# Patient Record
Sex: FEMALE | Race: WHITE | NOT HISPANIC OR LATINO | Employment: OTHER | URBAN - METROPOLITAN AREA
[De-identification: names, ages, dates, MRNs, and addresses within clinical notes are randomized per-mention and may not be internally consistent; named-entity substitution may affect disease eponyms.]

---

## 2017-11-08 ENCOUNTER — RECORDS - HEALTHEAST (OUTPATIENT)
Dept: LAB | Facility: CLINIC | Age: 77
End: 2017-11-08

## 2017-11-08 LAB
CHOLEST SERPL-MCNC: 193 MG/DL
FASTING STATUS PATIENT QL REPORTED: NORMAL
HDLC SERPL-MCNC: 63 MG/DL
LDLC SERPL CALC-MCNC: 103 MG/DL
TRIGL SERPL-MCNC: 137 MG/DL

## 2018-08-08 ENCOUNTER — RECORDS - HEALTHEAST (OUTPATIENT)
Dept: ADMINISTRATIVE | Facility: OTHER | Age: 78
End: 2018-08-08

## 2018-08-08 ENCOUNTER — AMBULATORY - HEALTHEAST (OUTPATIENT)
Dept: CARDIOLOGY | Facility: CLINIC | Age: 78
End: 2018-08-08

## 2018-08-09 ENCOUNTER — AMBULATORY - HEALTHEAST (OUTPATIENT)
Dept: CARDIOLOGY | Facility: CLINIC | Age: 78
End: 2018-08-09

## 2018-08-09 ENCOUNTER — OFFICE VISIT - HEALTHEAST (OUTPATIENT)
Dept: CARDIOLOGY | Facility: CLINIC | Age: 78
End: 2018-08-09

## 2018-08-09 DIAGNOSIS — Z95.0 S/P PLACEMENT OF CARDIAC PACEMAKER: ICD-10-CM

## 2018-08-09 DIAGNOSIS — I48.0 PAROXYSMAL ATRIAL FIBRILLATION (H): ICD-10-CM

## 2018-08-09 DIAGNOSIS — I44.1 MOBITZ TYPE 2 SECOND DEGREE ATRIOVENTRICULAR BLOCK: ICD-10-CM

## 2018-08-09 RX ORDER — ESTRADIOL 0.5 MG/1
1 TABLET ORAL DAILY
Status: SHIPPED | COMMUNITY
Start: 2018-08-09

## 2018-08-09 RX ORDER — ATORVASTATIN CALCIUM 10 MG/1
10 TABLET, FILM COATED ORAL DAILY
Status: SHIPPED | COMMUNITY
Start: 2018-04-05 | End: 2023-10-25

## 2018-08-09 RX ORDER — ACETAMINOPHEN 325 MG/1
650 TABLET ORAL PRN
Status: SHIPPED | COMMUNITY
Start: 2018-04-05

## 2018-08-09 ASSESSMENT — MIFFLIN-ST. JEOR: SCORE: 1201.18

## 2018-12-04 ENCOUNTER — RECORDS - HEALTHEAST (OUTPATIENT)
Dept: ADMINISTRATIVE | Facility: OTHER | Age: 78
End: 2018-12-04

## 2018-12-06 ENCOUNTER — AMBULATORY - HEALTHEAST (OUTPATIENT)
Dept: CARDIOLOGY | Facility: CLINIC | Age: 78
End: 2018-12-06

## 2018-12-06 ENCOUNTER — OFFICE VISIT - HEALTHEAST (OUTPATIENT)
Dept: CARDIOLOGY | Facility: CLINIC | Age: 78
End: 2018-12-06

## 2018-12-06 DIAGNOSIS — I44.1 MOBITZ TYPE 2 SECOND DEGREE ATRIOVENTRICULAR BLOCK: ICD-10-CM

## 2018-12-06 DIAGNOSIS — Z95.0 S/P PLACEMENT OF CARDIAC PACEMAKER: ICD-10-CM

## 2018-12-06 DIAGNOSIS — I48.0 PAROXYSMAL ATRIAL FIBRILLATION (H): ICD-10-CM

## 2018-12-06 LAB
HCC DEVICE COMMENTS: NORMAL
HCC DEVICE IMPLANTING PROVIDER: NORMAL
HCC DEVICE MANUFACTURE: NORMAL
HCC DEVICE MODEL: NORMAL
HCC DEVICE SERIAL NUMBER: NORMAL
HCC DEVICE TYPE: NORMAL

## 2018-12-06 ASSESSMENT — MIFFLIN-ST. JEOR: SCORE: 1222.04

## 2018-12-19 ENCOUNTER — AMBULATORY - HEALTHEAST (OUTPATIENT)
Dept: CARDIOLOGY | Facility: CLINIC | Age: 78
End: 2018-12-19

## 2019-03-06 ENCOUNTER — AMBULATORY - HEALTHEAST (OUTPATIENT)
Dept: CARDIOLOGY | Facility: CLINIC | Age: 79
End: 2019-03-06

## 2019-03-06 DIAGNOSIS — Z95.0 CARDIAC PACEMAKER IN SITU: ICD-10-CM

## 2019-03-08 ENCOUNTER — COMMUNICATION - HEALTHEAST (OUTPATIENT)
Dept: CARDIOLOGY | Facility: CLINIC | Age: 79
End: 2019-03-08

## 2019-04-09 ENCOUNTER — COMMUNICATION - HEALTHEAST (OUTPATIENT)
Dept: ADMINISTRATIVE | Facility: CLINIC | Age: 79
End: 2019-04-09

## 2019-05-30 ENCOUNTER — OFFICE VISIT - HEALTHEAST (OUTPATIENT)
Dept: CARDIOLOGY | Facility: CLINIC | Age: 79
End: 2019-05-30

## 2019-05-30 DIAGNOSIS — E78.2 MIXED HYPERLIPIDEMIA: ICD-10-CM

## 2019-05-30 DIAGNOSIS — I44.1 MOBITZ TYPE 2 SECOND DEGREE ATRIOVENTRICULAR BLOCK: ICD-10-CM

## 2019-05-30 DIAGNOSIS — I48.0 PAROXYSMAL ATRIAL FIBRILLATION (H): ICD-10-CM

## 2019-05-30 DIAGNOSIS — Z95.0 S/P PLACEMENT OF CARDIAC PACEMAKER: ICD-10-CM

## 2019-05-30 ASSESSMENT — MIFFLIN-ST. JEOR: SCORE: 1203.9

## 2019-06-04 ENCOUNTER — AMBULATORY - HEALTHEAST (OUTPATIENT)
Dept: CARDIOLOGY | Facility: CLINIC | Age: 79
End: 2019-06-04

## 2019-06-04 DIAGNOSIS — Z95.0 CARDIAC PACEMAKER IN SITU: ICD-10-CM

## 2019-06-10 ENCOUNTER — RECORDS - HEALTHEAST (OUTPATIENT)
Dept: LAB | Facility: CLINIC | Age: 79
End: 2019-06-10

## 2019-06-10 LAB
ALBUMIN SERPL-MCNC: 4.2 G/DL (ref 3.5–5)
ALP SERPL-CCNC: 69 U/L (ref 45–120)
ALT SERPL W P-5'-P-CCNC: 23 U/L (ref 0–45)
ANION GAP SERPL CALCULATED.3IONS-SCNC: 6 MMOL/L (ref 5–18)
AST SERPL W P-5'-P-CCNC: 31 U/L (ref 0–40)
BILIRUB SERPL-MCNC: 0.5 MG/DL (ref 0–1)
BUN SERPL-MCNC: 13 MG/DL (ref 8–28)
CALCIUM SERPL-MCNC: 10.4 MG/DL (ref 8.5–10.5)
CHLORIDE BLD-SCNC: 104 MMOL/L (ref 98–107)
CHOLEST SERPL-MCNC: 157 MG/DL
CO2 SERPL-SCNC: 31 MMOL/L (ref 22–31)
CREAT SERPL-MCNC: 0.78 MG/DL (ref 0.6–1.1)
FASTING STATUS PATIENT QL REPORTED: NORMAL
GFR SERPL CREATININE-BSD FRML MDRD: >60 ML/MIN/1.73M2
GLUCOSE BLD-MCNC: 82 MG/DL (ref 70–125)
HDLC SERPL-MCNC: 67 MG/DL
LDLC SERPL CALC-MCNC: 63 MG/DL
POTASSIUM BLD-SCNC: 3.9 MMOL/L (ref 3.5–5)
PROT SERPL-MCNC: 7.5 G/DL (ref 6–8)
SODIUM SERPL-SCNC: 141 MMOL/L (ref 136–145)
TRIGL SERPL-MCNC: 134 MG/DL

## 2019-07-26 ENCOUNTER — COMMUNICATION - HEALTHEAST (OUTPATIENT)
Dept: CARDIOLOGY | Facility: CLINIC | Age: 79
End: 2019-07-26

## 2019-07-26 DIAGNOSIS — I48.0 PAROXYSMAL ATRIAL FIBRILLATION (H): ICD-10-CM

## 2019-07-26 RX ORDER — APIXABAN 5 MG/1
TABLET, FILM COATED ORAL
Qty: 180 TABLET | Refills: 2 | Status: SHIPPED | OUTPATIENT
Start: 2019-07-26

## 2019-09-05 ENCOUNTER — AMBULATORY - HEALTHEAST (OUTPATIENT)
Dept: CARDIOLOGY | Facility: CLINIC | Age: 79
End: 2019-09-05

## 2019-09-05 DIAGNOSIS — Z95.0 CARDIAC PACEMAKER IN SITU: ICD-10-CM

## 2019-11-27 ENCOUNTER — AMBULATORY - HEALTHEAST (OUTPATIENT)
Dept: CARDIOLOGY | Facility: CLINIC | Age: 79
End: 2019-11-27

## 2019-11-27 ENCOUNTER — RECORDS - HEALTHEAST (OUTPATIENT)
Dept: ADMINISTRATIVE | Facility: OTHER | Age: 79
End: 2019-11-27

## 2019-12-02 ENCOUNTER — AMBULATORY - HEALTHEAST (OUTPATIENT)
Dept: CARDIOLOGY | Facility: CLINIC | Age: 79
End: 2019-12-02

## 2019-12-02 ENCOUNTER — OFFICE VISIT - HEALTHEAST (OUTPATIENT)
Dept: CARDIOLOGY | Facility: CLINIC | Age: 79
End: 2019-12-02

## 2019-12-02 DIAGNOSIS — Z95.0 CARDIAC PACEMAKER IN SITU: ICD-10-CM

## 2019-12-02 DIAGNOSIS — E78.2 MIXED HYPERLIPIDEMIA: ICD-10-CM

## 2019-12-02 DIAGNOSIS — I48.0 PAROXYSMAL ATRIAL FIBRILLATION (H): ICD-10-CM

## 2019-12-02 DIAGNOSIS — Z95.0 S/P PLACEMENT OF CARDIAC PACEMAKER: ICD-10-CM

## 2019-12-02 DIAGNOSIS — I44.1 MOBITZ TYPE 2 SECOND DEGREE ATRIOVENTRICULAR BLOCK: ICD-10-CM

## 2019-12-02 ASSESSMENT — MIFFLIN-ST. JEOR: SCORE: 1203.9

## 2020-03-02 ENCOUNTER — AMBULATORY - HEALTHEAST (OUTPATIENT)
Dept: CARDIOLOGY | Facility: CLINIC | Age: 80
End: 2020-03-02

## 2020-03-02 DIAGNOSIS — I44.1 MOBITZ TYPE 2 SECOND DEGREE ATRIOVENTRICULAR BLOCK: ICD-10-CM

## 2020-03-02 DIAGNOSIS — Z95.0 CARDIAC PACEMAKER IN SITU: ICD-10-CM

## 2021-05-24 ENCOUNTER — RECORDS - HEALTHEAST (OUTPATIENT)
Dept: ADMINISTRATIVE | Facility: CLINIC | Age: 81
End: 2021-05-24

## 2021-05-25 ENCOUNTER — RECORDS - HEALTHEAST (OUTPATIENT)
Dept: ADMINISTRATIVE | Facility: CLINIC | Age: 81
End: 2021-05-25

## 2021-05-26 ENCOUNTER — RECORDS - HEALTHEAST (OUTPATIENT)
Dept: ADMINISTRATIVE | Facility: CLINIC | Age: 81
End: 2021-05-26

## 2021-05-28 ENCOUNTER — RECORDS - HEALTHEAST (OUTPATIENT)
Dept: ADMINISTRATIVE | Facility: CLINIC | Age: 81
End: 2021-05-28

## 2021-05-29 ENCOUNTER — RECORDS - HEALTHEAST (OUTPATIENT)
Dept: ADMINISTRATIVE | Facility: CLINIC | Age: 81
End: 2021-05-29

## 2021-05-31 ENCOUNTER — RECORDS - HEALTHEAST (OUTPATIENT)
Dept: ADMINISTRATIVE | Facility: CLINIC | Age: 81
End: 2021-05-31

## 2021-06-01 VITALS — WEIGHT: 163.4 LBS | BODY MASS INDEX: 27.9 KG/M2 | HEIGHT: 64 IN

## 2021-06-02 ENCOUNTER — RECORDS - HEALTHEAST (OUTPATIENT)
Dept: ADMINISTRATIVE | Facility: CLINIC | Age: 81
End: 2021-06-02

## 2021-06-02 VITALS — WEIGHT: 168 LBS | HEIGHT: 64 IN | BODY MASS INDEX: 28.68 KG/M2

## 2021-06-03 VITALS — HEIGHT: 64 IN | BODY MASS INDEX: 28 KG/M2 | WEIGHT: 164 LBS

## 2021-06-03 NOTE — PROGRESS NOTES
In clinic device check with Device RN and follow-up with Dr. Banerjee.  Please see link for full device report.  Patient was informed of results and next follow up during today's visit.

## 2021-06-04 VITALS
BODY MASS INDEX: 28 KG/M2 | WEIGHT: 164 LBS | SYSTOLIC BLOOD PRESSURE: 140 MMHG | HEART RATE: 76 BPM | DIASTOLIC BLOOD PRESSURE: 82 MMHG | RESPIRATION RATE: 16 BRPM | HEIGHT: 64 IN

## 2021-06-16 PROBLEM — E78.2 MIXED HYPERLIPIDEMIA: Status: ACTIVE | Noted: 2019-05-30

## 2021-06-16 PROBLEM — Z95.0 CARDIAC PACEMAKER IN SITU: Status: ACTIVE | Noted: 2020-03-02

## 2021-06-16 PROBLEM — I48.0 PAROXYSMAL ATRIAL FIBRILLATION (H): Status: ACTIVE | Noted: 2018-08-09

## 2021-06-16 PROBLEM — Z95.0 S/P PLACEMENT OF CARDIAC PACEMAKER: Status: ACTIVE | Noted: 2018-08-09

## 2021-06-16 PROBLEM — I44.1 MOBITZ TYPE 2 SECOND DEGREE ATRIOVENTRICULAR BLOCK: Status: ACTIVE | Noted: 2018-08-09

## 2021-06-16 NOTE — TELEPHONE ENCOUNTER
Telephone Encounter by Jada Santillan RN at 3/19/2019  9:58 AM     Author: Jada Santillan RN Service: -- Author Type: Registered Nurse    Filed: 3/19/2019  9:59 AM Encounter Date: 3/8/2019 Status: Signed    : Jada Santillan RN (Registered Nurse)       Alycia Celis   Female, 78 y.o., 1940  Weight:   168 lb (76.2 kg)  Phone:   [phone]  PCP:   Sarah Boyer MD  MRN:   [id number]  MyChart:   Pending  Next Appt:   None  Pref Name:   None  Pref Language:   English  Need :   None  Encounter Type:   None  My Pat List Reminders:   None  Message   Received: Yesterday   Message Contents   Trinity Banerjee MD Wiatros, Suzanne M, RN   Caller: Unspecified (1 week ago)             If she had no symptoms, continue to observe.   Thanks   [place]     Noted. W

## 2021-06-18 NOTE — LETTER
Letter by Natalee Dan EPS at      Author: Natalee Dan EPS Service: -- Author Type: --    Filed:  Encounter Date: 3/6/2019 Status: (Other)       Alycia Celis  [address]  [place] [address]      March 7, 2019      Dear Ms. Celis,    RE: Remote Results    We are writing to you regarding your recent Remote Pacemaker check from home. Your transmission was received successfully. Battery status is satisfactory at this time.     Your results are being reviewed.    Your next device appointment will be a clinic visit.  Please call in April to schedule.      To schedule or reschedule, please call [phone] and press 1.    NOTE: If you would like to do an extra transmission, please call [phone] and press 3 to speak to a nurse BEFORE transmitting. This ensures that the Device Clinic staff is aware of the reason you are sending a transmission, and can follow-up with you after it has been reviewed.    We will be checking your implanted device from home (remotely) every three months unless otherwise instructed. We will need to see you in the clinic at least once a year. You may need to be seen in the clinic sooner depending on the results of your check.    Please be aware:    The follow-up schedule is like a Physician prescription.    Your remote monitor is paired to your specific implanted device.      Sincerely,    [facility]

## 2021-06-19 NOTE — LETTER
Letter by Angeles Carnes RN at      Author: Angeles Carnes RN Service: -- Author Type: --    Filed:  Encounter Date: 9/5/2019 Status: (Other)         Alycia Celis  [address]  [place] [address]      September 5, 2019      Dear Ms. Celis,    RE: Remote Results    We are writing to you regarding your recent Remote Pacemaker check from home. Your transmission was received successfully. Battery status is satisfactory at this time.     Your results are within normal limits.    Your next device appointment will be a clinic visit.  Please call in September to schedule.      To schedule or reschedule, please call [phone] and press 1.    NOTE: If you would like to do an extra transmission, please call [phone] and press 3 to speak to a nurse BEFORE transmitting. This ensures that the Device Clinic staff is aware of the reason you are sending a transmission, and can follow-up with you after it has been reviewed.    We will be checking your implanted device from home (remotely) every three months unless otherwise instructed. We will need to see you in the clinic at least once a year. You may need to be seen in the clinic sooner depending on the results of your check.    Please be aware:    The follow-up schedule is like a Physician prescription.    Your remote monitor is paired to your specific implanted device.      Sincerely,    [facility]

## 2021-06-19 NOTE — LETTER
Letter by Pat Machado at      Author: Pat Machado Service: -- Author Type: --    Filed:  Encounter Date: 6/4/2019 Status: (Other)         Alycia Celis  [address]  [place] [address]      June 4, 2019      Dear Ms. Celis,    RE: Remote Results    We are writing to you regarding your recent Remote Pacemaker check from home. Your transmission was received successfully. Battery status is satisfactory at this time.     Your results are within normal limits.    Your next device appointment will be a remote check on September 5, 2019; this will occur automatically.    To schedule or reschedule, please call [phone] and press 1.    NOTE: If you would like to do an extra transmission, please call [phone] and press 3 to speak to a nurse BEFORE transmitting. This ensures that the Device Clinic staff is aware of the reason you are sending a transmission, and can follow-up with you after it has been reviewed.    We will be checking your implanted device from home (remotely) every three months unless otherwise instructed. We will need to see you in the clinic at least once a year. You may need to be seen in the clinic sooner depending on the results of your check.    Please be aware:    The follow-up schedule is like a Physician prescription.    Your remote monitor is paired to your specific implanted device.      Sincerely,    [facility]

## 2021-06-19 NOTE — LETTER
Letter by Triinty Banerjee MD at      Author: Trinity Banerjee MD Service: -- Author Type: --    Filed:  Encounter Date: 4/9/2019 Status: (Other)         Alycia Celis  [address]  [place] [address]      April 9, 2019      Dear Alycia,    This letter is to remind you that you will be due for your follow up appointment with Dr. Trinity Banerjee  . To help ensure you are in the best health possible, a regular follow-up with your cardiologist is essential.     Please call our Patient Scheduling Line at [phone] to schedule your appointment at your earliest convenience.  If you have recently scheduled an appointment, please disregard this letter.    We look forward to seeing you again. As always, we are available at the number  above for any questions or concerns you may have.      Sincerely,     [facility] Phys[facility] and Staff of [facility]

## 2021-06-20 NOTE — LETTER
Letter by Lisa See RDCS at      Author: Lisa See RDCS Service: -- Author Type: --    Filed:  Encounter Date: 3/2/2020 Status: (Other)         Alycia Celis  [address]  [place] [address]      March 2, 2020      Dear Ms. Celis,    RE: Remote Results    We are writing to you regarding your recent Remote Pacemaker check from home. Your transmission was received successfully. Battery status is satisfactory at this time.     Your results are showing no significant changes.    Your next device appointment will be a remote check on June 2, 2020; this will occur automatically.    To schedule or reschedule, please call [phone] and press 1.    NOTE: If you would like to do an extra transmission, please call [phone] and press 3 to speak to a nurse BEFORE transmitting. This ensures that the Device Clinic staff is aware of the reason you are sending a transmission, and can follow-up with you after it has been reviewed.    We will be checking your implanted device from home (remotely) every three months unless otherwise instructed. We will need to see you in the clinic at least once a year. You may need to be seen in the clinic sooner depending on the results of your check.    Please be aware:    The follow-up schedule is like a Physician prescription.    Your remote monitor is paired to your specific implanted device.      Sincerely,    [facility]

## 2021-06-22 NOTE — PROGRESS NOTES
In clinic device check with Dr. Banerjee.  Please see link for full device report.  Patient was informed of results and next follow up during today's visit.

## 2021-06-24 NOTE — TELEPHONE ENCOUNTER
"Ms. Celis returning our call.  We discussed the transmission findings.  Ms. Celis states, \"That was the day I came back from [place].  I don't have anything written down for that day.  I don't remember having any specific issues.\"  I informed Ms. Celis that we send this information to Dr. Banerjee for him to review and call her back should he wish further follow-up.  Also reiterated if she has any questions or concerns to call the Device Clinic at [phone] Option 3.  Routed to Dr. Banerjee for review.  Jada Santillan, RN, MA  Device Nurse  Formerly [facility]    "

## 2021-06-24 NOTE — TELEPHONE ENCOUNTER
Type: Routine pacemaker remote transmission.   Presenting Rhythm: AS-, rate in the 70s.   Lead/Battery status: Stable.   Arrhythmias: Since 12/6/18, no mode switches detected. 1 VT detected, NSVT, ~17 seconds, rates appear to start ~110s/120s and increased to 160s before termination.   EF=60% per echo 4/4/18.   Comments: Normal device function. Routed to Device RN for review. CAH      Transmission reviewed, agree with above. An episode of VT lasting ~ 17 seconds was recorded by device on 2/19/19 at ~8:30 pm. Rates start out slow the progressed to ~ 160 bpm before terminating. Last Echo showed normal EF.     Call placed to patient to assess for any correlating symptoms, LVM for callback.     Will review with Dr. Banerjee after assessed.     Elisa Rosenberg RN

## 2021-06-26 NOTE — PROGRESS NOTES
Progress Notes by Trinity Banerjee MD at 12/6/2018  2:50 PM     Author: Trinity Banerjee MD Service: -- Author Type: Physician    Filed: 12/6/2018  3:20 PM Encounter Date: 12/6/2018 Status: Signed    : Trinity Banerjee MD (Physician)           Click to link to [facility] NOTE    Thank you, Dr. Boyer, for asking me to see Alycia Celis in consultation at [facility] to evaluate new onset atrial fibrillation.      Assessment/Plan:   1.  Paroxysmal atrial fibrillation: The patient had 20 minutes of for paroxysmal atrial fibrillation on September 3.  She had several episodes of atrial fibrillation, but not lasted very long.  When she had atrial fibrillation, her heart rate is not fast.  There is no indication to start medication for rate control.  Her [id number] score is 3.  Continue Eliquis 5 mg twice a day.      2.  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      Thank you for the opportunity to be involved in the care of Alycia Celis. If you have any questions, please feel free to contact me.  I will see the patient again in 6 months.    Much or all of the text in this note was generated through the use of Dragon Dictate voice-to-text software. Errors in spelling or words which seem out of context are unintentional.   Sound alike errors, in particular, may have escaped editing.       History of Present Illness:   It is my pleasure to see Alycia Celis at the [facility] for evaluation of Follow-up. Alycia Celis is a 78 y.o. 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    The patient has been doing pretty well.  She had no symptoms.  She did not have chest pain, shortness of breath, lightheadedness or dizziness.  She did not have syncopal  episode.  She did not feel palpitated.  She had no orthopnea PND or leg edema.  Her blood pressure and heart rate are in normal range.    Past Medical History:     Patient Active Problem List   Diagnosis   ? 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 S/P placement of cardiac pacemaker   ? Paroxysmal atrial fibrillation (H)       Past Surgical History:     Past Surgical History:   Procedure Laterality Date   ? BACK SURGERY     ? CHOLECYSTECTOMY     ? HYSTERECTOMY  at age 43       Family History:     Family History   Problem Relation Age of Onset   ? Breast cancer Sister    ? Endometrial cancer Daughter    ? Stroke Mother    ? CABG Father        Social History:    reports that she quit smoking about 34 years ago. She smoked 1.00 pack per day. she has never used smokeless tobacco.    Review of Systems:   General: WNL  Eyes: WNL  Ears/Nose/Throat: WNL  Lungs: WNL  Heart: WNL  Stomach: WNL  Bladder: WNL  Muscle/Joints: WNL  Skin: WNL  Nervous System: WNL  Mental Health: WNL     Blood: WNL    Meds:     Current Outpatient Medications:   ?  acetaminophen (TYLENOL) 325 MG tablet, Take 650 mg by mouth as needed., Disp: , Rfl:   ?  apixaban (ELIQUIS) 5 mg Tab tablet, Take 1 tablet (5 mg total) by mouth 2 (two) times a day., Disp: 60 tablet, Rfl: 11  ?  aspirin 81 MG EC tablet, Take 81 mg by mouth daily., Disp: , Rfl:   ?  atorvastatin (LIPITOR) 10 MG tablet, Take 10 mg by mouth daily., Disp: , Rfl:   ?  calcium carbonate-vitamin D3 (OS-TOD) 500-100 mg-unit chewable tablet, Chew 500 mg daily., Disp: , Rfl:   ?  cholecalciferol, vitamin D3, 5,000 unit Tab, Take 5,000 Units by mouth., Disp: , Rfl:   ?  estradiol (ESTRACE) 0.5 MG tablet, Take 1 mg by mouth daily., Disp: , Rfl:   ?  omega 3-dha-epa-fish oil (FISH OIL) 60- mg cap capsule, Take 1,000 mg by mouth daily., Disp: , Rfl:   ?  vit C/E/Zn/coppr/lutein/zeaxan (PRESERVISION AREDS 2 ORAL), Take 1 tablet by mouth., Disp: , Rfl:      Allergies:  "  Erythromycin    Objective:      Physical Exam  168 lb (76.2 kg)  5' 4\" (1.626 m)  Body mass index is 28.84 kg/m .  /70 (Patient Site: Left Arm, Patient Position: Sitting, Cuff Size: Adult Regular)   Pulse 85   Resp 20   Ht 5' 4\" (1.626 m)   Wt 168 lb (76.2 kg)   BMI 28.84 kg/m      General Appearance:   Awake, Alert, No acute distress.   HEENT:  Pupil equal, reactive to light. No scleral icterus; the mucous membranes were moist. No oral ulcers or thrush.    Neck: No cervical bruits. No JVD. No thyromegaly. No lymph node enlargement or tenderness.   Chest: The spine was straight. The chest was symmetric.   Lungs:   Respirations unlabored. Lungs are clear to auscultation. No crackles. No wheezing.   Cardiovascular:   RRR, normal first and second heart sounds with no murmurs. No rubs or gallops.    Abdomen:  Soft. No tenderness. Non-distended. Bowels sounds are present   Extremities: Equal posterior tibial pulses. No leg edema.   Skin: No rashes or ulcers. Warm, Dry.   Musculoskeletal: No tenderness. No deformity.   Neurologic: Mood and affect are appropriate. No focal deficits.         Pacemaker interrogation report from [facility] on 8-5-2018:  Personally reivewed  Pacemaker remote alert for 55minutes of AT/AF since last session.    Presenting rhythm is sinus ASVP- indication for pacemaker is third degree   AVB.  Stable lead trends.  Battery 12years.  5 AT/AF detections since   7/13/2018.  AF detections 8/5/2018 x5 intermittently starting at 1817 to   1914 lasting 2.5minutes to 22minutes.  EGMs reveal PAF.  This is a new   finding for this pt.  Spoke with pt and she did notice an irregular heart   rate.  CHADsVASC calculates to 3 for age and female.  Recommend pt discuss   this with her PCP to assess for anti-coag.  We will continue to monitor her   pacemaker and inform her of future episodes.    Device interrogation today:  20 minutes of atrial fibrillation on September 3, 2018.  Normal device " function.    Cardiac Imaging Studies  ECHO from [facility] on 4-:   CONCLUSION:    Normal LV size and systolic function.     Mild concentric LVH.     Normal RV size and function.     Normal echo    No previous for comparison    Left Ventricular Ejection Fraction: 60 %     Lab Review   Lab Results   Component Value Date     11/08/2017    K 4.2 11/08/2017     11/08/2017    CO2 28 11/08/2017    BUN 15 11/08/2017    CREATININE 0.74 11/08/2017    CALCIUM 9.8 11/08/2017     Lab Results   Component Value Date    WBC 6.9 07/18/2013    HGB 12.1 07/18/2013    HCT 36.1 07/18/2013    MCV 98 07/18/2013     07/18/2013     Lab Results   Component Value Date    CHOL 193 11/08/2017    TRIG 137 11/08/2017    HDL 63 11/08/2017

## 2021-06-26 NOTE — PROGRESS NOTES
Progress Notes by Trinity Banerjee MD at 8/9/2018 10:50 AM     Author: Trinity Banerjee MD Service: -- Author Type: Physician    Filed: 8/9/2018 12:13 PM Encounter Date: 8/9/2018 Status: Signed    : Trinity Banerjee MD (Physician)           Click to link to [facility] NOTE    Thank you, Dr. Boyer, for asking me to see Alycia Celis in consultation at [facility] to evaluate new onset atrial fibrillation.      Assessment/Plan:   1.  Paroxysmal atrial fibrillation: This is new to patient.  She had 20 minutes of atrial fibrillation.  Now she is in sinus rhythm.  The etiology of her paroxysmal atrial fibrillation could be related to age.  No other obvious explanation.  We discussed management of paroxysmal atrial fibrillation.  We will observe her in the next 2-3 months.  Recheck pacemaker to see the atrial fibrillation boredom.  Based on her clinical presentation and pacemaker into the patient reports, we will decide if she is going to start antiarrhythmic medication like sotalol.  Her [id number] score is 3.  We discussed anticoagulation.  The patient agreed with the chronic anticoagulation.  We compared warfarin versus NOAC.  Start Eliquis 5 mg twice a day.  The patient with the plan.    2.  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      Thank you for the opportunity to be involved in the care of Alycia Celis. If you have any questions, please feel free to contact me.  I will see the patient again in 3 months.    Much or all of the text in this note was generated through the use of Dragon Dictate voice-to-text software. Errors in spelling or words which seem out of context are unintentional.   Sound alike errors, in particular, may have escaped editing.       History of Present Illness:   It is my pleasure to see Alycia Celis at the [facility] for evaluation of  Consult. Alycia Celis is a 77 y.o. female with a medical history of Mobitz 2 second-degree AV block status post a dual-chamber pacemaker placement in April 2018.    The patient was doing fine until 4 days ago.  Suddenly, she felt irregular heartbeats.  She did not have chest pain, shortness of breath, but she did feel lightheaded and funny feeling of her head.  She did not have syncopal episode.  She did not feel palpitation.  She had no orthopnea PND or leg edema.  Her blood pressure and heart rate are in normal range.    [facility]  She was taught she had 20 minutes of paroxysmal atrial fibrillation.    Past Medical History:     Patient Active Problem List   Diagnosis   ? 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 S/P placement of cardiac pacemaker       Past Surgical History:     Past Surgical History:   Procedure Laterality Date   ? BACK SURGERY     ? CHOLECYSTECTOMY     ? HYSTERECTOMY  at age 43       Family History:     Family History   Problem Relation Age of Onset   ? Breast cancer Sister    ? Endometrial cancer Daughter    ? Stroke Mother    ? CABG Father        Social History:    reports that she quit smoking about 34 years ago. She smoked 1.00 pack per day. She has never used smokeless tobacco.    Review of Systems:   General: WNL  Eyes: WNL  Ears/Nose/Throat: WNL  Lungs: WNL  Heart: Irregular Heartbeat  Stomach: WNL  Bladder: WNL  Muscle/Joints: WNL  Skin: WNL  Nervous System: WNL  Mental Health: WNL     Blood: WNL    Meds:     Current Outpatient Prescriptions:   ?  acetaminophen (TYLENOL) 325 MG tablet, Take 650 mg by mouth as needed., Disp: , Rfl:   ?  aspirin 81 MG EC tablet, Take 81 mg by mouth daily., Disp: , Rfl:   ?  atorvastatin (LIPITOR) 10 MG tablet, Take 10 mg by mouth daily., Disp: , Rfl:   ?  calcium carbonate-vitamin D3 (OS-TOD) 500-100 mg-unit chewable tablet, Chew 500 mg daily., Disp: , Rfl:   ?  cholecalciferol, vitamin D3, 5,000 unit Tab, Take  "5,000 Units by mouth., Disp: , Rfl:   ?  estradiol (ESTRACE) 0.5 MG tablet, Take 1 mg by mouth daily., Disp: , Rfl:   ?  omega 3-dha-epa-fish oil (FISH OIL) 60- mg cap capsule, Take 1,000 mg by mouth daily., Disp: , Rfl:   ?  vit C/E/Zn/coppr/lutein/zeaxan (PRESERVISION AREDS 2 ORAL), Take 1 tablet by mouth., Disp: , Rfl:   ?  apixaban (ELIQUIS) 5 mg Tab tablet, Take 1 tablet (5 mg total) by mouth 2 (two) times a day., Disp: 60 tablet, Rfl: 11     Allergies:   Erythromycin    Objective:      Physical Exam  163 lb 6.4 oz (74.1 kg)  5' 4\" (1.626 m)  Body mass index is 28.05 kg/(m^2).  /80 (Patient Site: Right Arm, Patient Position: Sitting, Cuff Size: Adult Regular)  Pulse 80  Resp 8  Ht 5' 4\" (1.626 m)  Wt 163 lb 6.4 oz (74.1 kg)  BMI 28.05 kg/m2    General Appearance:   Awake, Alert, No acute distress.   HEENT:  Pupil equal, reactive to light. No scleral icterus; the mucous membranes were moist. No oral ulcers or thrush.    Neck: No cervical bruits. No JVD. No thyromegaly. No lymph node enlargement or tenderness.   Chest: The spine was straight. The chest was symmetric.   Lungs:   Respirations unlabored. Lungs are clear to auscultation. No crackles. No wheezing.   Cardiovascular:   RRR, normal first and second heart sounds with no murmurs. No rubs or gallops.    Abdomen:  Soft. No tenderness. Non-distended. Bowels sounds are present   Extremities: Equal posterior tibial pulses. No leg edema.   Skin: No rashes or ulcers. Warm, Dry.   Musculoskeletal: No tenderness. No deformity.   Neurologic: Mood and affect are appropriate. No focal deficits.         Pacemaker interrogation report from [facility] on 8-5-2018:  Personally reivewed  Pacemaker remote alert for 55minutes of AT/AF since last session.    Presenting rhythm is sinus ASVP- indication for pacemaker is third degree   AVB.  Stable lead trends.  Battery 12years.  5 AT/AF detections since   7/13/2018.  AF detections 8/5/2018 x5 " intermittently starting at 1817 to   1914 lasting 2.5minutes to 22minutes.  EGMs reveal PAF.  This is a new   finding for this pt.  Spoke with pt and she did notice an irregular heart   rate.  CHADsVASC calculates to 3 for age and female.  Recommend pt discuss   this with her PCP to assess for anti-coag.  We will continue to monitor her   pacemaker and inform her of future episodes.    Cardiac Imaging Studies  ECHO from [facility] on 4-:   CONCLUSION:    Normal LV size and systolic function.     Mild concentric LVH.     Normal RV size and function.     Normal echo    No previous for comparison    Left Ventricular Ejection Fraction: 60 %     Lab Review   Lab Results   Component Value Date     11/08/2017    K 4.2 11/08/2017     11/08/2017    CO2 28 11/08/2017    BUN 15 11/08/2017    CREATININE 0.74 11/08/2017    CALCIUM 9.8 11/08/2017     Lab Results   Component Value Date    WBC 6.9 07/18/2013    HGB 12.1 07/18/2013    HCT 36.1 07/18/2013    MCV 98 07/18/2013     07/18/2013     Lab Results   Component Value Date    CHOL 193 11/08/2017    TRIG 137 11/08/2017    HDL 63 11/08/2017

## 2021-06-27 NOTE — PROGRESS NOTES
Progress Notes by Trinity Banerjee MD at 5/30/2019  7:50 AM     Author: Trinity Banerjee MD Service: -- Author Type: Physician    Filed: 5/30/2019  8:26 AM Encounter Date: 5/30/2019 Status: Signed    : Trinity Banerjee MD (Physician)           Click to link to [facility] Care     [facility] HEART CARE NOTE       Assessment/Plan:   1.  Paroxysmal atrial fibrillation: The patient did not have atrial fibrillation based on pacemaker interrogation on April 4, 2019 since December 2018.  She had 17 seconds of nonsustained V. tach, not quite sure it is atrial fibrillation with aberrancy.  The patient was asymptomatic.  She had no dizziness, palpitations when she had nonsustained V. tach.  She had normal left ventricular size and function.  She can do regular exercise including treadmill with no symptoms.  We discussed the options.  Since the patient completely asymptomatic, doing very well, continue to observe her.  If she developed any symptoms, she will call me back.  Her [id number] score is 3.  Continue Eliquis 5 mg twice a day.  Aspirin 81 mg daily was discontinued.    2.  Status post dual-chamber pacemaker placement secondary to Mobitz 2 AV block: Normal device function, continue to follow-up with device clinic.    3.  Mixed hyperlipidemia: On Lipitor 10 mg at bedtime.  The patient is going to have routine labs including lipid profile and liver function at Dr. Boyer's office on June 10th for her annual physical exam.    Thank you for the opportunity to be involved in the care of Alycia Celis. If you have any questions, please feel free to contact me.  I will see the patient again in 6 months.    Much or all of the text in this note was generated through the use of Dragon Dictate voice-to-text software. Errors in spelling or words which seem out of context are unintentional.   Sound alike errors, in particular, may have escaped editing.       History of Present Illness:   It is my pleasure to see Alycia Celis at the  [facility] clinic for evaluation of Follow-up. Alycia Celis is a 78 y.o. 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    The patient has been doing pretty well.  She had no symptoms.  She did not have chest pain, shortness of breath, lightheadedness or dizziness.  She did not have syncopal episode.  She did not feel palpitated.  She had no orthopnea PND or leg edema.  Her blood pressure and heart rate are in normal range.  She has been doing cardiac exercise without chest pain, dizziness, palpitations or shortness of breath.  She had no side effects from Eliquis so far.    Past Medical History:     Patient Active Problem List   Diagnosis   ? 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 S/P placement of cardiac pacemaker   ? Paroxysmal atrial fibrillation (H)       Past Surgical History:     Past Surgical History:   Procedure Laterality Date   ? BACK SURGERY     ? CHOLECYSTECTOMY     ? HYSTERECTOMY  at age 43       Family History:     Family History   Problem Relation Age of Onset   ? Breast cancer Sister    ? Endometrial cancer Daughter    ? Stroke Mother    ? CABG Father        Social History:    reports that she quit smoking about 35 years ago. She smoked 1.00 pack per day. She has never used smokeless tobacco.    Review of Systems:   General: WNL  Eyes: WNL  Ears/Nose/Throat: WNL  Lungs: WNL  Heart: WNL  Stomach: WNL  Bladder: WNL  Muscle/Joints: WNL  Skin: WNL  Nervous System: WNL  Mental Health: WNL     Blood: WNL    Meds:     Current Outpatient Medications:   ?  acetaminophen (TYLENOL) 325 MG tablet, Take 650 mg by mouth as needed., Disp: , Rfl:   ?  apixaban (ELIQUIS) 5 mg Tab tablet, Take 1 tablet (5 mg total) by mouth 2 (two) times a day., Disp: 60 tablet, Rfl: 11  ?  atorvastatin (LIPITOR) 10 MG tablet, Take 10 mg by mouth daily., Disp: , Rfl:   ?  calcium  "carbonate-vitamin D3 (OS-TOD) 500-100 mg-unit chewable tablet, Chew 500 mg daily., Disp: , Rfl:   ?  cholecalciferol, vitamin D3, 5,000 unit Tab, Take 5,000 Units by mouth., Disp: , Rfl:   ?  estradiol (ESTRACE) 0.5 MG tablet, Take 1 mg by mouth daily., Disp: , Rfl:   ?  omega 3-dha-epa-fish oil (FISH OIL) 60- mg cap capsule, Take 1,000 mg by mouth daily., Disp: , Rfl:   ?  vit C/E/Zn/coppr/lutein/zeaxan (PRESERVISION AREDS 2 ORAL), Take 1 tablet by mouth., Disp: , Rfl:      Allergies:   Erythromycin    Objective:      Physical Exam  164 lb (74.4 kg)  5' 4\" (1.626 m)  Body mass index is 28.15 kg/m .  /74 (Patient Site: Right Arm, Patient Position: Sitting, Cuff Size: Adult Regular)   Pulse 68   Resp 16   Ht 5' 4\" (1.626 m)   Wt 164 lb (74.4 kg)   BMI 28.15 kg/m      General Appearance:   Awake, Alert, No acute distress.   HEENT:  Pupil equal, reactive to light. No scleral icterus; the mucous membranes were moist. No oral ulcers or thrush.    Neck: No cervical bruits. No JVD. No thyromegaly. No lymph node enlargement or tenderness.   Chest: The spine was straight. The chest was symmetric.   Lungs:   Respirations unlabored. Lungs are clear to auscultation. No crackles. No wheezing.   Cardiovascular:   RRR, normal first and second heart sounds with no murmurs. No rubs or gallops.    Abdomen:  Soft. No tenderness. Non-distended. Bowels sounds are present   Extremities: Equal posterior tibial pulses. No leg edema.   Skin: No rashes or ulcers. Warm, Dry.   Musculoskeletal: No tenderness. No deformity.   Neurologic: Mood and affect are appropriate. No focal deficits.         Pacemaker report 4-4-2019:  Personally reivewed  Device interrogation:  17 seconds of nonsustained ventricular tachycardia, ventricular rate start 110 bpm going up to 160 bpm.  The patient was asymptomatic.  Normal device function.    Cardiac Imaging Studies  ECHO from [facility] on 4-:   CONCLUSION:    Normal LV size and " systolic function.     Mild concentric LVH.     Normal RV size and function.     Normal echo    No previous for comparison    Left Ventricular Ejection Fraction: 60 %     Lab Review   Lab Results   Component Value Date     11/08/2017    K 4.2 11/08/2017     11/08/2017    CO2 28 11/08/2017    BUN 15 11/08/2017    CREATININE 0.74 11/08/2017    CALCIUM 9.8 11/08/2017     Lab Results   Component Value Date    WBC 6.9 07/18/2013    HGB 12.1 07/18/2013    HCT 36.1 07/18/2013    MCV 98 07/18/2013     07/18/2013     Lab Results   Component Value Date    CHOL 193 11/08/2017    TRIG 137 11/08/2017    HDL 63 11/08/2017

## 2021-06-28 NOTE — PROGRESS NOTES
Progress Notes by Trinity Banerjee MD at 12/2/2019 12:50 PM     Author: Trinity Banerjee MD Service: -- Author Type: Physician    Filed: 12/2/2019  1:06 PM Encounter Date: 12/2/2019 Status: Signed    : Trinity Banerjee MD (Physician)              Click to link to [facility] HEART [facility] NOTE       Assessment/Plan:   1.  Paroxysmal atrial fibrillation: The patient has done well over last 6 months.  No palpitations.  1 episode of atrial fibrillation lasted 1.7 hours on June 20, 2019.  The patient was asymptomatic.  Continue to observe.  Her [id number] score is 3.  Continue Eliquis 5 mg twice a day.      2.  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 tach as mentioned below.  Continue to observe.  Patient was asymptomatic.       3.  Mixed hyperlipidemia: On Lipitor 10 mg at bedtime.  Her LDL was well controlled.    Thank you for the opportunity to be involved in the care of Alycia Celis. If you have any questions, please feel free to contact me.  I will see the patient again in 12 months and as needed.    Much or all of the text in this note was generated through the use of Dragon Dictate voice-to-text software. Errors in spelling or words which seem out of context are unintentional.   Sound alike errors, in particular, may have escaped editing.       History of Present Illness:   It is my pleasure to see Alycia Celis at the [facility] clinic for evaluation of Follow-up and pacemaker interrogation. Alycia Celis is a 79 y.o. female with a medical history of Mobitz 2 second-degree AV block status post a dual-chamber pacemaker placement in April 2018, hyperlipidemia and paroxysmal atrial fibrillation.    The patient has been doing pretty well.  She had no symptoms.  She did not have chest pain, shortness of breath,  lightheadedness or dizziness.  She did not have syncopal episode.  She did not feel palpitated.  She had no orthopnea PND or leg edema.  Her blood pressure and heart rate are in normal range.  She had no side effects from Eliquis so far.    Her pacemaker interrogation was reported one episode of atrial fibrillation 1.7 hours on June 20, 2019.  The patient did not recall any symptoms at that time.  Patient had 1 nonsustained ventricular tachycardia in February 2019.  She did not have recurrent episode.    Past Medical History:     Patient Active Problem List   Diagnosis   ? 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 S/P placement of cardiac pacemaker   ? Paroxysmal atrial fibrillation (H)   ? Mixed hyperlipidemia       Past Surgical History:     Past Surgical History:   Procedure Laterality Date   ? BACK SURGERY     ? CHOLECYSTECTOMY     ? HYSTERECTOMY  at age 43       Family History:     Family History   Problem Relation Age of Onset   ? Breast cancer Sister    ? Endometrial cancer Daughter    ? Stroke Mother    ? CABG Father        Social History:    reports that she quit smoking about 35 years ago. She smoked 1.00 pack per day. She has never used smokeless tobacco.    Review of Systems:   General: WNL  Eyes: WNL  Ears/Nose/Throat: WNL  Lungs: WNL  Heart: WNL  Stomach: WNL  Bladder: WNL  Muscle/Joints: WNL  Skin: WNL  Nervous System: WNL  Mental Health: WNL     Blood: WNL    Meds:     Current Outpatient Medications:   ?  atorvastatin (LIPITOR) 10 MG tablet, Take 10 mg by mouth daily., Disp: , Rfl:   ?  calcium carbonate-vitamin D3 (OS-TOD) 500-100 mg-unit chewable tablet, Chew 500 mg daily., Disp: , Rfl:   ?  cholecalciferol, vitamin D3, 5,000 unit Tab, Take 5,000 Units by mouth., Disp: , Rfl:   ?  ELIQUIS 5 mg Tab tablet, Take 1 tablet (5 mg total) by mouth 2 (two) times a day., Disp: 180 tablet, Rfl: 2  ?  estradiol (ESTRACE) 0.5 MG tablet, Take 1 mg by mouth daily., Disp: , Rfl:   ?  omega  "3-dha-epa-fish oil (FISH OIL) 60- mg cap capsule, Take 1,000 mg by mouth daily., Disp: , Rfl:   ?  vit C/E/Zn/coppr/lutein/zeaxan (PRESERVISION AREDS 2 ORAL), Take 1 tablet by mouth., Disp: , Rfl:   ?  acetaminophen (TYLENOL) 325 MG tablet, Take 650 mg by mouth as needed., Disp: , Rfl:      Allergies:   Erythromycin    Objective:      Physical Exam  164 lb (74.4 kg)  5' 4\" (1.626 m)  Body mass index is 28.15 kg/m .  /82 (Patient Site: Left Arm, Patient Position: Sitting, Cuff Size: Adult Regular)   Pulse 76   Resp 16   Ht 5' 4\" (1.626 m)   Wt 164 lb (74.4 kg) Comment: per patient  BMI 28.15 kg/m      General Appearance:   Awake, Alert, No acute distress.   HEENT:  Pupil equal, reactive to light. No scleral icterus; the mucous membranes were moist. No oral ulcers or thrush.    Neck: No cervical bruits. No JVD. No thyromegaly. No lymph node enlargement or tenderness.   Chest: The spine was straight. The chest was symmetric.   Lungs:   Respirations unlabored. Lungs are clear to auscultation. No crackles. No wheezing.   Cardiovascular:   RRR, normal first and second heart sounds with no murmurs. No rubs or gallops.    Abdomen:  Soft. No tenderness. Non-distended. Bowels sounds are present   Extremities: Equal posterior tibial pulses. No leg edema.   Skin: No rashes or ulcers. Warm, Dry.   Musculoskeletal: No tenderness. No deformity.   Neurologic: Mood and affect are appropriate. No focal deficits.         Pacemaker report 4-4-2019:  Personally reivewed  Device interrogation:  17 seconds of nonsustained ventricular tachycardia, ventricular rate start 110 bpm going up to 160 bpm.  The patient was asymptomatic.  Normal device function.    Cardiac Imaging Studies  ECHO from [facility] on 4-:   CONCLUSION:    Normal LV size and systolic function.     Mild concentric LVH.     Normal RV size and function.     Normal echo    No previous for comparison    Left Ventricular Ejection Fraction: 60 % "     Lab Review   Lab Results   Component Value Date     06/10/2019    K 3.9 06/10/2019     06/10/2019    CO2 31 06/10/2019    BUN 13 06/10/2019    CREATININE 0.78 06/10/2019    CALCIUM 10.4 06/10/2019     Lab Results   Component Value Date    WBC 6.9 07/18/2013    HGB 12.1 07/18/2013    HCT 36.1 07/18/2013    MCV 98 07/18/2013     07/18/2013     Lab Results   Component Value Date    CHOL 157 06/10/2019    TRIG 134 06/10/2019    HDL 67 06/10/2019

## 2021-07-13 ENCOUNTER — RECORDS - HEALTHEAST (OUTPATIENT)
Dept: ADMINISTRATIVE | Facility: CLINIC | Age: 81
End: 2021-07-13

## 2021-07-21 ENCOUNTER — RECORDS - HEALTHEAST (OUTPATIENT)
Dept: ADMINISTRATIVE | Facility: CLINIC | Age: 81
End: 2021-07-21

## 2021-07-22 ENCOUNTER — RECORDS - HEALTHEAST (OUTPATIENT)
Dept: MAMMOGRAPHY | Facility: CLINIC | Age: 81
End: 2021-07-22

## 2021-07-22 DIAGNOSIS — Z12.31 OTHER SCREENING MAMMOGRAM: ICD-10-CM

## 2023-08-01 ENCOUNTER — ANCILLARY ORDERS (OUTPATIENT)
Dept: CARDIOLOGY | Facility: CLINIC | Age: 83
End: 2023-08-01

## 2023-08-01 DIAGNOSIS — Z95.810 ICD (IMPLANTABLE CARDIOVERTER-DEFIBRILLATOR) IN PLACE: Primary | ICD-10-CM

## 2023-08-03 ENCOUNTER — ANCILLARY PROCEDURE (OUTPATIENT)
Dept: CARDIOLOGY | Facility: CLINIC | Age: 83
End: 2023-08-03
Attending: INTERNAL MEDICINE
Payer: COMMERCIAL

## 2023-08-03 DIAGNOSIS — Z95.0 CARDIAC PACEMAKER IN SITU: ICD-10-CM

## 2023-08-03 DIAGNOSIS — Z95.810 ICD (IMPLANTABLE CARDIOVERTER-DEFIBRILLATOR) IN PLACE: Primary | ICD-10-CM

## 2023-08-03 PROCEDURE — 93280 PM DEVICE PROGR EVAL DUAL: CPT | Performed by: INTERNAL MEDICINE

## 2023-09-13 LAB
MDC_IDC_EPISODE_DTM: NORMAL
MDC_IDC_EPISODE_DURATION: 66 S
MDC_IDC_EPISODE_ID: 31
MDC_IDC_EPISODE_TYPE: NORMAL
MDC_IDC_LEAD_IMPLANT_DT: NORMAL
MDC_IDC_LEAD_IMPLANT_DT: NORMAL
MDC_IDC_LEAD_LOCATION: NORMAL
MDC_IDC_LEAD_LOCATION: NORMAL
MDC_IDC_LEAD_LOCATION_DETAIL_1: NORMAL
MDC_IDC_LEAD_LOCATION_DETAIL_1: NORMAL
MDC_IDC_LEAD_MFG: NORMAL
MDC_IDC_LEAD_MFG: NORMAL
MDC_IDC_LEAD_MODEL: NORMAL
MDC_IDC_LEAD_MODEL: NORMAL
MDC_IDC_LEAD_POLARITY_TYPE: NORMAL
MDC_IDC_LEAD_POLARITY_TYPE: NORMAL
MDC_IDC_LEAD_SERIAL: NORMAL
MDC_IDC_LEAD_SERIAL: NORMAL
MDC_IDC_LEAD_SPECIAL_FUNCTION: NORMAL
MDC_IDC_LEAD_SPECIAL_FUNCTION: NORMAL
MDC_IDC_MSMT_BATTERY_DTM: NORMAL
MDC_IDC_MSMT_BATTERY_REMAINING_LONGEVITY: 106 MO
MDC_IDC_MSMT_BATTERY_RRT_TRIGGER: 2.62
MDC_IDC_MSMT_BATTERY_STATUS: NORMAL
MDC_IDC_MSMT_BATTERY_VOLTAGE: 3 V
MDC_IDC_MSMT_LEADCHNL_RA_IMPEDANCE_VALUE: 323 OHM
MDC_IDC_MSMT_LEADCHNL_RA_IMPEDANCE_VALUE: 513 OHM
MDC_IDC_MSMT_LEADCHNL_RA_IMPEDANCE_VALUE: 513 OHM
MDC_IDC_MSMT_LEADCHNL_RA_PACING_THRESHOLD_AMPLITUDE: 0.5 V
MDC_IDC_MSMT_LEADCHNL_RA_PACING_THRESHOLD_AMPLITUDE: 0.62 V
MDC_IDC_MSMT_LEADCHNL_RA_PACING_THRESHOLD_PULSEWIDTH: 0.4 MS
MDC_IDC_MSMT_LEADCHNL_RA_PACING_THRESHOLD_PULSEWIDTH: 0.4 MS
MDC_IDC_MSMT_LEADCHNL_RA_SENSING_INTR_AMPL: 3.25 MV
MDC_IDC_MSMT_LEADCHNL_RA_SENSING_INTR_AMPL: 4.38 MV
MDC_IDC_MSMT_LEADCHNL_RA_SENSING_INTR_AMPL: 4.4 MV
MDC_IDC_MSMT_LEADCHNL_RV_IMPEDANCE_VALUE: 342 OHM
MDC_IDC_MSMT_LEADCHNL_RV_IMPEDANCE_VALUE: 437 OHM
MDC_IDC_MSMT_LEADCHNL_RV_IMPEDANCE_VALUE: 437 OHM
MDC_IDC_MSMT_LEADCHNL_RV_PACING_THRESHOLD_AMPLITUDE: 0.75 V
MDC_IDC_MSMT_LEADCHNL_RV_PACING_THRESHOLD_AMPLITUDE: 0.88 V
MDC_IDC_MSMT_LEADCHNL_RV_PACING_THRESHOLD_PULSEWIDTH: 0.4 MS
MDC_IDC_MSMT_LEADCHNL_RV_PACING_THRESHOLD_PULSEWIDTH: 0.4 MS
MDC_IDC_MSMT_LEADCHNL_RV_SENSING_INTR_AMPL: 3 MV
MDC_IDC_MSMT_LEADCHNL_RV_SENSING_INTR_AMPL: 3.6 MV
MDC_IDC_MSMT_LEADCHNL_RV_SENSING_INTR_AMPL: 3.62 MV
MDC_IDC_PG_IMPLANT_DTM: NORMAL
MDC_IDC_PG_MFG: NORMAL
MDC_IDC_PG_MODEL: NORMAL
MDC_IDC_PG_SERIAL: NORMAL
MDC_IDC_PG_TYPE: NORMAL
MDC_IDC_SESS_CLINIC_NAME: NORMAL
MDC_IDC_SESS_DTM: NORMAL
MDC_IDC_SESS_TYPE: NORMAL
MDC_IDC_SET_BRADY_AT_MODE_SWITCH_RATE: 171 {BEATS}/MIN
MDC_IDC_SET_BRADY_HYSTRATE: NORMAL
MDC_IDC_SET_BRADY_LOWRATE: 60 {BEATS}/MIN
MDC_IDC_SET_BRADY_MAX_SENSOR_RATE: 130 {BEATS}/MIN
MDC_IDC_SET_BRADY_MAX_TRACKING_RATE: 130 {BEATS}/MIN
MDC_IDC_SET_BRADY_MODE: NORMAL
MDC_IDC_SET_BRADY_PAV_DELAY_LOW: 180 MS
MDC_IDC_SET_BRADY_SAV_DELAY_LOW: 150 MS
MDC_IDC_SET_LEADCHNL_RA_PACING_AMPLITUDE: 1.5 V
MDC_IDC_SET_LEADCHNL_RA_PACING_ANODE_ELECTRODE_1: NORMAL
MDC_IDC_SET_LEADCHNL_RA_PACING_ANODE_LOCATION_1: NORMAL
MDC_IDC_SET_LEADCHNL_RA_PACING_CAPTURE_MODE: NORMAL
MDC_IDC_SET_LEADCHNL_RA_PACING_CATHODE_ELECTRODE_1: NORMAL
MDC_IDC_SET_LEADCHNL_RA_PACING_CATHODE_LOCATION_1: NORMAL
MDC_IDC_SET_LEADCHNL_RA_PACING_POLARITY: NORMAL
MDC_IDC_SET_LEADCHNL_RA_PACING_PULSEWIDTH: 0.4 MS
MDC_IDC_SET_LEADCHNL_RA_SENSING_ANODE_ELECTRODE_1: NORMAL
MDC_IDC_SET_LEADCHNL_RA_SENSING_ANODE_LOCATION_1: NORMAL
MDC_IDC_SET_LEADCHNL_RA_SENSING_CATHODE_ELECTRODE_1: NORMAL
MDC_IDC_SET_LEADCHNL_RA_SENSING_CATHODE_LOCATION_1: NORMAL
MDC_IDC_SET_LEADCHNL_RA_SENSING_POLARITY: NORMAL
MDC_IDC_SET_LEADCHNL_RA_SENSING_SENSITIVITY: 0.3 MV
MDC_IDC_SET_LEADCHNL_RV_PACING_AMPLITUDE: 1.5 V
MDC_IDC_SET_LEADCHNL_RV_PACING_ANODE_ELECTRODE_1: NORMAL
MDC_IDC_SET_LEADCHNL_RV_PACING_ANODE_LOCATION_1: NORMAL
MDC_IDC_SET_LEADCHNL_RV_PACING_CAPTURE_MODE: NORMAL
MDC_IDC_SET_LEADCHNL_RV_PACING_CATHODE_ELECTRODE_1: NORMAL
MDC_IDC_SET_LEADCHNL_RV_PACING_CATHODE_LOCATION_1: NORMAL
MDC_IDC_SET_LEADCHNL_RV_PACING_POLARITY: NORMAL
MDC_IDC_SET_LEADCHNL_RV_PACING_PULSEWIDTH: 0.4 MS
MDC_IDC_SET_LEADCHNL_RV_SENSING_ANODE_ELECTRODE_1: NORMAL
MDC_IDC_SET_LEADCHNL_RV_SENSING_ANODE_LOCATION_1: NORMAL
MDC_IDC_SET_LEADCHNL_RV_SENSING_CATHODE_ELECTRODE_1: NORMAL
MDC_IDC_SET_LEADCHNL_RV_SENSING_CATHODE_LOCATION_1: NORMAL
MDC_IDC_SET_LEADCHNL_RV_SENSING_POLARITY: NORMAL
MDC_IDC_SET_LEADCHNL_RV_SENSING_SENSITIVITY: 0.9 MV
MDC_IDC_SET_ZONE_DETECTION_INTERVAL: 200 MS
MDC_IDC_SET_ZONE_DETECTION_INTERVAL: 350 MS
MDC_IDC_SET_ZONE_DETECTION_INTERVAL: 400 MS
MDC_IDC_SET_ZONE_TYPE: NORMAL
MDC_IDC_STAT_AT_BURDEN_PERCENT: 0 %
MDC_IDC_STAT_AT_DTM_END: NORMAL
MDC_IDC_STAT_AT_DTM_START: NORMAL
MDC_IDC_STAT_BRADY_AP_VP_PERCENT: 0.19 %
MDC_IDC_STAT_BRADY_AP_VS_PERCENT: 19.61 %
MDC_IDC_STAT_BRADY_AS_VP_PERCENT: 0.47 %
MDC_IDC_STAT_BRADY_AS_VS_PERCENT: 79.73 %
MDC_IDC_STAT_BRADY_DTM_END: NORMAL
MDC_IDC_STAT_BRADY_DTM_START: NORMAL
MDC_IDC_STAT_BRADY_RA_PERCENT_PACED: 19.84 %
MDC_IDC_STAT_BRADY_RV_PERCENT_PACED: 0.67 %
MDC_IDC_STAT_EPISODE_RECENT_COUNT: 0
MDC_IDC_STAT_EPISODE_RECENT_COUNT: 0
MDC_IDC_STAT_EPISODE_RECENT_COUNT: 1
MDC_IDC_STAT_EPISODE_RECENT_COUNT_DTM_END: NORMAL
MDC_IDC_STAT_EPISODE_RECENT_COUNT_DTM_START: NORMAL
MDC_IDC_STAT_EPISODE_TOTAL_COUNT: 0
MDC_IDC_STAT_EPISODE_TOTAL_COUNT: 22
MDC_IDC_STAT_EPISODE_TOTAL_COUNT: 8
MDC_IDC_STAT_EPISODE_TOTAL_COUNT_DTM_END: NORMAL
MDC_IDC_STAT_EPISODE_TOTAL_COUNT_DTM_START: NORMAL
MDC_IDC_STAT_EPISODE_TYPE: NORMAL

## 2023-09-25 ENCOUNTER — TRANSFERRED RECORDS (OUTPATIENT)
Dept: HEALTH INFORMATION MANAGEMENT | Facility: CLINIC | Age: 83
End: 2023-09-25
Payer: COMMERCIAL

## 2023-10-25 ENCOUNTER — OFFICE VISIT (OUTPATIENT)
Dept: CARDIOLOGY | Facility: CLINIC | Age: 83
End: 2023-10-25
Payer: COMMERCIAL

## 2023-10-25 VITALS
HEIGHT: 64 IN | OXYGEN SATURATION: 97 % | BODY MASS INDEX: 21.85 KG/M2 | RESPIRATION RATE: 16 BRPM | WEIGHT: 128 LBS | HEART RATE: 74 BPM | SYSTOLIC BLOOD PRESSURE: 112 MMHG | DIASTOLIC BLOOD PRESSURE: 70 MMHG

## 2023-10-25 DIAGNOSIS — I48.0 PAROXYSMAL ATRIAL FIBRILLATION (H): Primary | ICD-10-CM

## 2023-10-25 DIAGNOSIS — E78.2 MIXED HYPERLIPIDEMIA: ICD-10-CM

## 2023-10-25 DIAGNOSIS — Z95.0 CARDIAC PACEMAKER IN SITU: ICD-10-CM

## 2023-10-25 PROCEDURE — 99204 OFFICE O/P NEW MOD 45 MIN: CPT | Performed by: INTERNAL MEDICINE

## 2023-10-25 RX ORDER — ZOLPIDEM TARTRATE 10 MG/1
TABLET ORAL
COMMUNITY
Start: 2023-10-03

## 2023-10-25 NOTE — PROGRESS NOTES
Assessment/Plan:   1.  Paroxysmal atrial fibrillation: The patient has done well over last 2 to 3 years months.  No palpitations.  Less than 1 minute in atrial fibrillation based on pacemaker interrogation.  The patient was asymptomatic.  Continue to observe.  Her [id number] score is 3.  Continue Eliquis 5 mg twice a day.  We discussed the Watchman device placement instead of long-term anticoagulation.  Provided the information.  The patient will think about it and let us know.     2.  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  Continue to follow-up with device clinic.     3.  Mixed hyperlipidemia: On Lipitor 10 mg at bedtime.  Her LDL was well controlled.    Thank you for the opportunity to be involved in the care of Alycia Duvall. If you have any questions, please feel free to contact me.  I will see the patient again in 12 months and as needed.    Much or all of the text in this note was generated through the use of Dragon Dictate voice-to-text software. Errors in spelling or words which seem out of context are unintentional. Sound alike errors, in particular, may have escaped editing.       History of Present Illness:   It is my pleasure to see Alycia Duvall at the [facility] clinic for routine cardiology follow up.  Alycia Duvall is a 82 year old female with a medical history of Mobitz 2 second-degree AV block status post a dual-chamber pacemaker placement in April 2018, hyperlipidemia and paroxysmal atrial fibrillation.     The patient did not follow-up since December 2019.  She moved to [place] over last 2 to 3 years.  She just come back with her  and will live in [place] for good.  He states that she has been doing quite well over last 2 to 3 years.  She denies chest pain, shortness of breath, palpitations, dizziness, orthopnea, PND or leg edema.  Her  blood pressure and heart rate are controlled.  She has been taking Eliquis 5 mg twice a day, no GI bleeding.    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 Her smoking use included cigarettes. She smoked an average of 1 pack per day. She has never used smokeless tobacco.    Review of Systems:   12 systems are reviewed negative except for in HPI.    Meds:     Current Outpatient Medications:     acetaminophen (TYLENOL) 325 MG tablet, [ACETAMINOPHEN (TYLENOL) 325 MG TABLET] Take 650 mg by mouth as needed., Disp: , Rfl:     atorvastatin (LIPITOR) 10 MG tablet, [ATORVASTATIN (LIPITOR) 10 MG TABLET] Take 10 mg by mouth daily., Disp: , Rfl:     calcium carbonate-vitamin D3 (OS-TOD) 500-100 mg-unit chewable tablet, [CALCIUM CARBONATE-VITAMIN D3 (OS-TOD) 500-100 MG-UNIT CHEWABLE TABLET] Chew 500 mg daily., Disp: , Rfl:     cholecalciferol, vitamin D3, 5,000 unit Tab, [CHOLECALCIFEROL, VITAMIN D3, 5,000 UNIT TAB] Take 5,000 Units by mouth., Disp: , Rfl:     ELIQUIS 5 mg Tab tablet, [ELIQUIS 5 MG TAB TABLET] Take 1 tablet (5 mg total) by mouth 2 (two) times a day., Disp: 180 tablet, Rfl: 2    estradiol (ESTRACE) 0.5 MG tablet, [ESTRADIOL (ESTRACE) 0.5 MG TABLET] Take 1 mg by mouth daily., Disp: , Rfl:     omega 3-dha-epa-fish oil (FISH OIL) 60- mg cap capsule, [OMEGA 3-DHA-EPA-FISH OIL (FISH OIL) 60- MG CAP  "CAPSULE] Take 1,000 mg by mouth daily., Disp: , Rfl:     zolpidem (AMBIEN) 10 MG tablet, take 1 tablet by mouth once daily at bedtime as needed, Disp: , Rfl:     Allergies:   Erythromycin      Objective:      Physical Exam  58.1 kg (128 lb)  1.613 m (5' 3.5\")  Body mass index is 22.32 kg/m .  /70 (BP Location: Right arm, Patient Position: Sitting, Cuff Size: Adult Regular)   Pulse 74   Resp 16   Ht 1.613 m (5' 3.5\")   Wt 58.1 kg (128 lb)   SpO2 97%   BMI 22.32 kg/m      General Appearance:   Awake, Alert, No acute distress.   HEENT:  Pupil equal and reactive to light. No scleral icterus; the mucous membranes were moist.   Neck: No cervical bruits. No JVD. No thyromegaly.     Chest: The spine was straight. The chest was symmetric.   Lungs:   Respirations unlabored; Lungs are clear to auscultation. No crackles. No wheezing.   Cardiovascular:   Regular rhythm and rate, normal first and second heart sounds with no murmurs. No rubs or gallops.    Abdomen:  Soft. No tenderness. Non-distended. Bowels sounds are present   Extremities: Equal tibial pulses. No leg edema.   Skin: No rashes or ulcers. Warm, Dry.   Musculoskeletal: No tenderness. No deformity.   Neurologic: Mood and affect are appropriate. No focal deficits.         Pacemaker interrogation in August 2023: Personally reviewed  Normal device function  Short episodes of atrial fibrillation less than 1 minute, not frequent    Cardiac Imaging Studies  ECHO from [facility] on 4-:   CONCLUSION:    Normal LV size and systolic function.     Mild concentric LVH.     Normal RV size and function.     Normal echo    No previous for comparison    Left Ventricular Ejection Fraction: 60 %     Lab Review   Lab Results   Component Value Date     06/10/2019    CO2 31 06/10/2019    BUN 13 06/10/2019     No results found for: \"WBC\", \"HGB\", \"HCT\", \"MCV\", \"PLT\"  Lab Results   Component Value Date    CHOL 157 06/10/2019    CHOL 193 11/08/2017    CHOL 215 " "(H) 07/25/2016     Lab Results   Component Value Date    HDL 67 06/10/2019    HDL 63 11/08/2017    HDL 72 07/25/2016     No components found for: \"LDLCALC\"  Lab Results   Component Value Date    TRIG 134 06/10/2019    TRIG 137 11/08/2017    TRIG 160 (H) 07/25/2016               "

## 2023-10-25 NOTE — LETTER
10/25/2023    Sarah Boyer MD  [address]  [facility] [address]    RE: Alycia Duvall       Dear Colleague,     I had the pleasure of seeing Alycia Duvall in the [facility].            Assessment/Plan:   1.  Paroxysmal atrial fibrillation: The patient has done well over last 2 to 3 years months.  No palpitations.  Less than 1 minute in atrial fibrillation based on pacemaker interrogation.  The patient was asymptomatic.  Continue to observe.  Her [id number]-LZXa score is 3.  Continue Eliquis 5 mg twice a day.  We discussed the Watchman device placement instead of long-term anticoagulation.  Provided the information.  The patient will think about it and let us know.     2.  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  Continue to follow-up with device clinic.     3.  Mixed hyperlipidemia: On Lipitor 10 mg at bedtime.  Her LDL was well controlled.    Thank you for the opportunity to be involved in the care of Alycia Duvall. If you have any questions, please feel free to contact me.  I will see the patient again in 12 months and as needed.    Much or all of the text in this note was generated through the use of Dragon Dictate voice-to-text software. Errors in spelling or words which seem out of context are unintentional. Sound alike errors, in particular, may have escaped editing.       History of Present Illness:   It is my pleasure to see Alycia Duvall at the [facility] clinic for routine cardiology follow up.  Alycia Duvall is a 82 year old female with a medical history of Mobitz 2 second-degree AV block status post a dual-chamber pacemaker placement in April 2018, hyperlipidemia and paroxysmal atrial fibrillation.     The patient did not follow-up since December 2019.  She moved to [place] over last 2 to 3 years.  She just come back with her   and will live in [place] for good.  He states that she has been doing quite well over last 2 to 3 years.  She denies chest pain, shortness of breath, palpitations, dizziness, orthopnea, PND or leg edema.  Her blood pressure and heart rate are controlled.  She has been taking Eliquis 5 mg twice a day, no GI bleeding.    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 Her smoking use included cigarettes. She smoked an average of 1 pack per day. She has never used smokeless tobacco.    Review of Systems:   12 systems are reviewed negative except for in HPI.    Meds:     Current Outpatient Medications:     acetaminophen (TYLENOL) 325 MG tablet, [ACETAMINOPHEN (TYLENOL) 325 MG TABLET] Take 650 mg by mouth as needed., Disp: , Rfl:     atorvastatin (LIPITOR) 10 MG tablet, [ATORVASTATIN (LIPITOR) 10 MG TABLET] Take 10 mg by mouth daily., Disp: , Rfl:     calcium carbonate-vitamin D3 (OS-TOD) 500-100 mg-unit chewable tablet, [CALCIUM CARBONATE-VITAMIN D3 (OS-TOD) 500-100 MG-UNIT CHEWABLE TABLET] Chew 500 mg daily., Disp: , Rfl:     cholecalciferol, vitamin D3, 5,000 unit Tab, [CHOLECALCIFEROL, VITAMIN D3, 5,000 UNIT TAB] Take 5,000 Units by mouth., Disp: , Rfl:     ELIQUIS 5 mg Tab tablet, [ELIQUIS 5 MG TAB TABLET] Take 1 tablet (5 mg total) by mouth 2 (two) times a day., Disp: 180 tablet, Rfl: 2    estradiol (ESTRACE)  "0.5 MG tablet, [ESTRADIOL (ESTRACE) 0.5 MG TABLET] Take 1 mg by mouth daily., Disp: , Rfl:     omega 3-dha-epa-fish oil (FISH OIL) 60- mg cap capsule, [OMEGA 3-DHA-EPA-FISH OIL (FISH OIL) 60- MG CAP CAPSULE] Take 1,000 mg by mouth daily., Disp: , Rfl:     zolpidem (AMBIEN) 10 MG tablet, take 1 tablet by mouth once daily at bedtime as needed, Disp: , Rfl:     Allergies:   Erythromycin      Objective:      Physical Exam  58.1 kg (128 lb)  1.613 m (5' 3.5\")  Body mass index is 22.32 kg/m .  /70 (BP Location: Right arm, Patient Position: Sitting, Cuff Size: Adult Regular)   Pulse 74   Resp 16   Ht 1.613 m (5' 3.5\")   Wt 58.1 kg (128 lb)   SpO2 97%   BMI 22.32 kg/m      General Appearance:   Awake, Alert, No acute distress.   HEENT:  Pupil equal and reactive to light. No scleral icterus; the mucous membranes were moist.   Neck: No cervical bruits. No JVD. No thyromegaly.     Chest: The spine was straight. The chest was symmetric.   Lungs:   Respirations unlabored; Lungs are clear to auscultation. No crackles. No wheezing.   Cardiovascular:   Regular rhythm and rate, normal first and second heart sounds with no murmurs. No rubs or gallops.    Abdomen:  Soft. No tenderness. Non-distended. Bowels sounds are present   Extremities: Equal tibial pulses. No leg edema.   Skin: No rashes or ulcers. Warm, Dry.   Musculoskeletal: No tenderness. No deformity.   Neurologic: Mood and affect are appropriate. No focal deficits.         Pacemaker interrogation in August 2023: Personally reviewed  Normal device function  Short episodes of atrial fibrillation less than 1 minute, not frequent    Cardiac Imaging Studies  ECHO from [facility] on 4-:   CONCLUSION:    Normal LV size and systolic function.     Mild concentric LVH.     Normal RV size and function.     Normal echo    No previous for comparison    Left Ventricular Ejection Fraction: 60 %     Lab Review   Lab Results   Component Value Date    NA " "141 06/10/2019    CO2 31 06/10/2019    BUN 13 06/10/2019     No results found for: \"WBC\", \"HGB\", \"HCT\", \"MCV\", \"PLT\"  Lab Results   Component Value Date    CHOL 157 06/10/2019    CHOL 193 11/08/2017    CHOL 215 (H) 07/25/2016     Lab Results   Component Value Date    HDL 67 06/10/2019    HDL 63 11/08/2017    HDL 72 07/25/2016     No components found for: \"LDLCALC\"  Lab Results   Component Value Date    TRIG 134 06/10/2019    TRIG 137 11/08/2017    TRIG 160 (H) 07/25/2016                   Thank you for allowing me to participate in the care of your patient.      Sincerely,     Trinity Banerjee MD     [facility]  cc:   Referred Self,   "

## 2023-11-09 ENCOUNTER — ANCILLARY PROCEDURE (OUTPATIENT)
Dept: CARDIOLOGY | Facility: CLINIC | Age: 83
End: 2023-11-09
Attending: INTERNAL MEDICINE
Payer: COMMERCIAL

## 2023-11-09 DIAGNOSIS — Z95.0 CARDIAC PACEMAKER IN SITU: ICD-10-CM

## 2023-11-09 DIAGNOSIS — Z95.810 ICD (IMPLANTABLE CARDIOVERTER-DEFIBRILLATOR) IN PLACE: ICD-10-CM

## 2023-11-09 LAB
MDC_IDC_EPISODE_DTM: NORMAL
MDC_IDC_EPISODE_DURATION: 0 S
MDC_IDC_EPISODE_DURATION: 15 S
MDC_IDC_EPISODE_DURATION: 24 S
MDC_IDC_EPISODE_DURATION: 55 S
MDC_IDC_EPISODE_ID: 32
MDC_IDC_EPISODE_ID: 33
MDC_IDC_EPISODE_ID: 34
MDC_IDC_EPISODE_ID: 35
MDC_IDC_EPISODE_TYPE: NORMAL
MDC_IDC_LEAD_CONNECTION_STATUS: NORMAL
MDC_IDC_LEAD_CONNECTION_STATUS: NORMAL
MDC_IDC_LEAD_IMPLANT_DT: NORMAL
MDC_IDC_LEAD_IMPLANT_DT: NORMAL
MDC_IDC_LEAD_LOCATION: NORMAL
MDC_IDC_LEAD_LOCATION: NORMAL
MDC_IDC_LEAD_LOCATION_DETAIL_1: NORMAL
MDC_IDC_LEAD_LOCATION_DETAIL_1: NORMAL
MDC_IDC_LEAD_MFG: NORMAL
MDC_IDC_LEAD_MFG: NORMAL
MDC_IDC_LEAD_MODEL: NORMAL
MDC_IDC_LEAD_MODEL: NORMAL
MDC_IDC_LEAD_POLARITY_TYPE: NORMAL
MDC_IDC_LEAD_POLARITY_TYPE: NORMAL
MDC_IDC_LEAD_SERIAL: NORMAL
MDC_IDC_LEAD_SERIAL: NORMAL
MDC_IDC_LEAD_SPECIAL_FUNCTION: NORMAL
MDC_IDC_LEAD_SPECIAL_FUNCTION: NORMAL
MDC_IDC_MSMT_BATTERY_DTM: NORMAL
MDC_IDC_MSMT_BATTERY_REMAINING_LONGEVITY: 104 MO
MDC_IDC_MSMT_BATTERY_RRT_TRIGGER: 2.62
MDC_IDC_MSMT_BATTERY_STATUS: NORMAL
MDC_IDC_MSMT_BATTERY_VOLTAGE: 3 V
MDC_IDC_MSMT_LEADCHNL_RA_IMPEDANCE_VALUE: 323 OHM
MDC_IDC_MSMT_LEADCHNL_RA_IMPEDANCE_VALUE: 494 OHM
MDC_IDC_MSMT_LEADCHNL_RA_PACING_THRESHOLD_AMPLITUDE: 0.75 V
MDC_IDC_MSMT_LEADCHNL_RA_PACING_THRESHOLD_PULSEWIDTH: 0.4 MS
MDC_IDC_MSMT_LEADCHNL_RA_SENSING_INTR_AMPL: 3.62 MV
MDC_IDC_MSMT_LEADCHNL_RA_SENSING_INTR_AMPL: 3.62 MV
MDC_IDC_MSMT_LEADCHNL_RV_IMPEDANCE_VALUE: 342 OHM
MDC_IDC_MSMT_LEADCHNL_RV_IMPEDANCE_VALUE: 399 OHM
MDC_IDC_MSMT_LEADCHNL_RV_PACING_THRESHOLD_AMPLITUDE: 0.88 V
MDC_IDC_MSMT_LEADCHNL_RV_PACING_THRESHOLD_PULSEWIDTH: 0.4 MS
MDC_IDC_MSMT_LEADCHNL_RV_SENSING_INTR_AMPL: 2.38 MV
MDC_IDC_MSMT_LEADCHNL_RV_SENSING_INTR_AMPL: 2.38 MV
MDC_IDC_PG_IMPLANT_DTM: NORMAL
MDC_IDC_PG_MFG: NORMAL
MDC_IDC_PG_MODEL: NORMAL
MDC_IDC_PG_SERIAL: NORMAL
MDC_IDC_PG_TYPE: NORMAL
MDC_IDC_SESS_CLINIC_NAME: NORMAL
MDC_IDC_SESS_DTM: NORMAL
MDC_IDC_SESS_TYPE: NORMAL
MDC_IDC_SET_BRADY_AT_MODE_SWITCH_RATE: 171 {BEATS}/MIN
MDC_IDC_SET_BRADY_HYSTRATE: NORMAL
MDC_IDC_SET_BRADY_LOWRATE: 60 {BEATS}/MIN
MDC_IDC_SET_BRADY_MAX_SENSOR_RATE: 130 {BEATS}/MIN
MDC_IDC_SET_BRADY_MAX_TRACKING_RATE: 130 {BEATS}/MIN
MDC_IDC_SET_BRADY_MODE: NORMAL
MDC_IDC_SET_BRADY_PAV_DELAY_LOW: 180 MS
MDC_IDC_SET_BRADY_SAV_DELAY_LOW: 150 MS
MDC_IDC_SET_LEADCHNL_RA_PACING_AMPLITUDE: 1.5 V
MDC_IDC_SET_LEADCHNL_RA_PACING_ANODE_ELECTRODE_1: NORMAL
MDC_IDC_SET_LEADCHNL_RA_PACING_ANODE_LOCATION_1: NORMAL
MDC_IDC_SET_LEADCHNL_RA_PACING_CAPTURE_MODE: NORMAL
MDC_IDC_SET_LEADCHNL_RA_PACING_CATHODE_ELECTRODE_1: NORMAL
MDC_IDC_SET_LEADCHNL_RA_PACING_CATHODE_LOCATION_1: NORMAL
MDC_IDC_SET_LEADCHNL_RA_PACING_POLARITY: NORMAL
MDC_IDC_SET_LEADCHNL_RA_PACING_PULSEWIDTH: 0.4 MS
MDC_IDC_SET_LEADCHNL_RA_SENSING_ANODE_ELECTRODE_1: NORMAL
MDC_IDC_SET_LEADCHNL_RA_SENSING_ANODE_LOCATION_1: NORMAL
MDC_IDC_SET_LEADCHNL_RA_SENSING_CATHODE_ELECTRODE_1: NORMAL
MDC_IDC_SET_LEADCHNL_RA_SENSING_CATHODE_LOCATION_1: NORMAL
MDC_IDC_SET_LEADCHNL_RA_SENSING_POLARITY: NORMAL
MDC_IDC_SET_LEADCHNL_RA_SENSING_SENSITIVITY: 0.3 MV
MDC_IDC_SET_LEADCHNL_RV_PACING_AMPLITUDE: 1.5 V
MDC_IDC_SET_LEADCHNL_RV_PACING_ANODE_ELECTRODE_1: NORMAL
MDC_IDC_SET_LEADCHNL_RV_PACING_ANODE_LOCATION_1: NORMAL
MDC_IDC_SET_LEADCHNL_RV_PACING_CAPTURE_MODE: NORMAL
MDC_IDC_SET_LEADCHNL_RV_PACING_CATHODE_ELECTRODE_1: NORMAL
MDC_IDC_SET_LEADCHNL_RV_PACING_CATHODE_LOCATION_1: NORMAL
MDC_IDC_SET_LEADCHNL_RV_PACING_POLARITY: NORMAL
MDC_IDC_SET_LEADCHNL_RV_PACING_PULSEWIDTH: 0.4 MS
MDC_IDC_SET_LEADCHNL_RV_SENSING_ANODE_ELECTRODE_1: NORMAL
MDC_IDC_SET_LEADCHNL_RV_SENSING_ANODE_LOCATION_1: NORMAL
MDC_IDC_SET_LEADCHNL_RV_SENSING_CATHODE_ELECTRODE_1: NORMAL
MDC_IDC_SET_LEADCHNL_RV_SENSING_CATHODE_LOCATION_1: NORMAL
MDC_IDC_SET_LEADCHNL_RV_SENSING_POLARITY: NORMAL
MDC_IDC_SET_LEADCHNL_RV_SENSING_SENSITIVITY: 0.9 MV
MDC_IDC_SET_ZONE_DETECTION_INTERVAL: 200 MS
MDC_IDC_SET_ZONE_DETECTION_INTERVAL: 350 MS
MDC_IDC_SET_ZONE_DETECTION_INTERVAL: 400 MS
MDC_IDC_SET_ZONE_STATUS: NORMAL
MDC_IDC_SET_ZONE_TYPE: NORMAL
MDC_IDC_SET_ZONE_VENDOR_TYPE: NORMAL
MDC_IDC_STAT_AT_BURDEN_PERCENT: 0 %
MDC_IDC_STAT_AT_DTM_END: NORMAL
MDC_IDC_STAT_AT_DTM_START: NORMAL
MDC_IDC_STAT_BRADY_AP_VP_PERCENT: 0.03 %
MDC_IDC_STAT_BRADY_AP_VS_PERCENT: 28.42 %
MDC_IDC_STAT_BRADY_AS_VP_PERCENT: 0.04 %
MDC_IDC_STAT_BRADY_AS_VS_PERCENT: 71.51 %
MDC_IDC_STAT_BRADY_DTM_END: NORMAL
MDC_IDC_STAT_BRADY_DTM_START: NORMAL
MDC_IDC_STAT_BRADY_RA_PERCENT_PACED: 28.53 %
MDC_IDC_STAT_BRADY_RV_PERCENT_PACED: 0.07 %
MDC_IDC_STAT_EPISODE_RECENT_COUNT: 0
MDC_IDC_STAT_EPISODE_RECENT_COUNT: 1
MDC_IDC_STAT_EPISODE_RECENT_COUNT: 3
MDC_IDC_STAT_EPISODE_RECENT_COUNT_DTM_END: NORMAL
MDC_IDC_STAT_EPISODE_RECENT_COUNT_DTM_START: NORMAL
MDC_IDC_STAT_EPISODE_TOTAL_COUNT: 0
MDC_IDC_STAT_EPISODE_TOTAL_COUNT: 0
MDC_IDC_STAT_EPISODE_TOTAL_COUNT: 1
MDC_IDC_STAT_EPISODE_TOTAL_COUNT: 25
MDC_IDC_STAT_EPISODE_TOTAL_COUNT: 9
MDC_IDC_STAT_EPISODE_TOTAL_COUNT_DTM_END: NORMAL
MDC_IDC_STAT_EPISODE_TOTAL_COUNT_DTM_START: NORMAL
MDC_IDC_STAT_EPISODE_TYPE: NORMAL
MDC_IDC_STAT_TACHYTHERAPY_RECENT_DTM_END: NORMAL
MDC_IDC_STAT_TACHYTHERAPY_RECENT_DTM_START: NORMAL
MDC_IDC_STAT_TACHYTHERAPY_TOTAL_DTM_END: NORMAL
MDC_IDC_STAT_TACHYTHERAPY_TOTAL_DTM_START: NORMAL

## 2023-11-09 PROCEDURE — 93294 REM INTERROG EVL PM/LDLS PM: CPT | Performed by: INTERNAL MEDICINE

## 2023-11-09 PROCEDURE — 93296 REM INTERROG EVL PM/IDS: CPT | Performed by: INTERNAL MEDICINE

## 2024-02-08 ENCOUNTER — LAB REQUISITION (OUTPATIENT)
Dept: LAB | Facility: CLINIC | Age: 84
End: 2024-02-08
Payer: COMMERCIAL

## 2024-02-08 DIAGNOSIS — E78.5 HYPERLIPIDEMIA, UNSPECIFIED: ICD-10-CM

## 2024-02-08 PROCEDURE — 80053 COMPREHEN METABOLIC PANEL: CPT | Mod: ORL | Performed by: FAMILY MEDICINE

## 2024-02-08 PROCEDURE — 80061 LIPID PANEL: CPT | Mod: ORL | Performed by: FAMILY MEDICINE

## 2024-02-09 LAB
ALBUMIN SERPL BCG-MCNC: 4.1 G/DL (ref 3.5–5.2)
ALP SERPL-CCNC: 51 U/L (ref 40–150)
ALT SERPL W P-5'-P-CCNC: 17 U/L (ref 0–50)
ANION GAP SERPL CALCULATED.3IONS-SCNC: 13 MMOL/L (ref 7–15)
AST SERPL W P-5'-P-CCNC: 33 U/L (ref 0–45)
BILIRUB SERPL-MCNC: 0.6 MG/DL
BUN SERPL-MCNC: 14.9 MG/DL (ref 8–23)
CALCIUM SERPL-MCNC: 9.9 MG/DL (ref 8.8–10.2)
CHLORIDE SERPL-SCNC: 104 MMOL/L (ref 98–107)
CHOLEST SERPL-MCNC: 151 MG/DL
CREAT SERPL-MCNC: 0.73 MG/DL (ref 0.51–0.95)
DEPRECATED HCO3 PLAS-SCNC: 24 MMOL/L (ref 22–29)
EGFRCR SERPLBLD CKD-EPI 2021: 82 ML/MIN/1.73M2
FASTING STATUS PATIENT QL REPORTED: NORMAL
GLUCOSE SERPL-MCNC: 79 MG/DL (ref 70–99)
HDLC SERPL-MCNC: 84 MG/DL
LDLC SERPL CALC-MCNC: 52 MG/DL
NONHDLC SERPL-MCNC: 67 MG/DL
POTASSIUM SERPL-SCNC: 4.1 MMOL/L (ref 3.4–5.3)
PROT SERPL-MCNC: 7.4 G/DL (ref 6.4–8.3)
SODIUM SERPL-SCNC: 141 MMOL/L (ref 135–145)
TRIGL SERPL-MCNC: 77 MG/DL

## 2024-02-21 ENCOUNTER — ANCILLARY PROCEDURE (OUTPATIENT)
Dept: CARDIOLOGY | Facility: CLINIC | Age: 84
End: 2024-02-21
Attending: INTERNAL MEDICINE
Payer: COMMERCIAL

## 2024-02-21 DIAGNOSIS — Z95.810 ICD (IMPLANTABLE CARDIOVERTER-DEFIBRILLATOR) IN PLACE: ICD-10-CM

## 2024-02-21 DIAGNOSIS — Z95.0 CARDIAC PACEMAKER IN SITU: ICD-10-CM

## 2024-02-21 LAB
MDC_IDC_EPISODE_DTM: NORMAL
MDC_IDC_EPISODE_DTM: NORMAL
MDC_IDC_EPISODE_DURATION: 42 S
MDC_IDC_EPISODE_DURATION: 63 S
MDC_IDC_EPISODE_ID: 36
MDC_IDC_EPISODE_ID: 37
MDC_IDC_EPISODE_TYPE: NORMAL
MDC_IDC_EPISODE_TYPE: NORMAL
MDC_IDC_LEAD_CONNECTION_STATUS: NORMAL
MDC_IDC_LEAD_CONNECTION_STATUS: NORMAL
MDC_IDC_LEAD_IMPLANT_DT: NORMAL
MDC_IDC_LEAD_IMPLANT_DT: NORMAL
MDC_IDC_LEAD_LOCATION: NORMAL
MDC_IDC_LEAD_LOCATION: NORMAL
MDC_IDC_LEAD_LOCATION_DETAIL_1: NORMAL
MDC_IDC_LEAD_LOCATION_DETAIL_1: NORMAL
MDC_IDC_LEAD_MFG: NORMAL
MDC_IDC_LEAD_MFG: NORMAL
MDC_IDC_LEAD_MODEL: NORMAL
MDC_IDC_LEAD_MODEL: NORMAL
MDC_IDC_LEAD_POLARITY_TYPE: NORMAL
MDC_IDC_LEAD_POLARITY_TYPE: NORMAL
MDC_IDC_LEAD_SERIAL: NORMAL
MDC_IDC_LEAD_SERIAL: NORMAL
MDC_IDC_LEAD_SPECIAL_FUNCTION: NORMAL
MDC_IDC_LEAD_SPECIAL_FUNCTION: NORMAL
MDC_IDC_MSMT_BATTERY_DTM: NORMAL
MDC_IDC_MSMT_BATTERY_REMAINING_LONGEVITY: 102 MO
MDC_IDC_MSMT_BATTERY_RRT_TRIGGER: 2.62
MDC_IDC_MSMT_BATTERY_STATUS: NORMAL
MDC_IDC_MSMT_BATTERY_VOLTAGE: 2.99 V
MDC_IDC_MSMT_LEADCHNL_RA_IMPEDANCE_VALUE: 304 OHM
MDC_IDC_MSMT_LEADCHNL_RA_IMPEDANCE_VALUE: 456 OHM
MDC_IDC_MSMT_LEADCHNL_RA_PACING_THRESHOLD_AMPLITUDE: 0.62 V
MDC_IDC_MSMT_LEADCHNL_RA_PACING_THRESHOLD_PULSEWIDTH: 0.4 MS
MDC_IDC_MSMT_LEADCHNL_RA_SENSING_INTR_AMPL: 2.88 MV
MDC_IDC_MSMT_LEADCHNL_RA_SENSING_INTR_AMPL: 2.88 MV
MDC_IDC_MSMT_LEADCHNL_RV_IMPEDANCE_VALUE: 323 OHM
MDC_IDC_MSMT_LEADCHNL_RV_IMPEDANCE_VALUE: 399 OHM
MDC_IDC_MSMT_LEADCHNL_RV_PACING_THRESHOLD_AMPLITUDE: 1 V
MDC_IDC_MSMT_LEADCHNL_RV_PACING_THRESHOLD_PULSEWIDTH: 0.4 MS
MDC_IDC_MSMT_LEADCHNL_RV_SENSING_INTR_AMPL: 1.75 MV
MDC_IDC_MSMT_LEADCHNL_RV_SENSING_INTR_AMPL: 1.75 MV
MDC_IDC_PG_IMPLANT_DTM: NORMAL
MDC_IDC_PG_MFG: NORMAL
MDC_IDC_PG_MODEL: NORMAL
MDC_IDC_PG_SERIAL: NORMAL
MDC_IDC_PG_TYPE: NORMAL
MDC_IDC_SESS_CLINIC_NAME: NORMAL
MDC_IDC_SESS_DTM: NORMAL
MDC_IDC_SESS_TYPE: NORMAL
MDC_IDC_SET_BRADY_AT_MODE_SWITCH_RATE: 171 {BEATS}/MIN
MDC_IDC_SET_BRADY_HYSTRATE: NORMAL
MDC_IDC_SET_BRADY_LOWRATE: 60 {BEATS}/MIN
MDC_IDC_SET_BRADY_MAX_SENSOR_RATE: 130 {BEATS}/MIN
MDC_IDC_SET_BRADY_MAX_TRACKING_RATE: 130 {BEATS}/MIN
MDC_IDC_SET_BRADY_MODE: NORMAL
MDC_IDC_SET_BRADY_PAV_DELAY_LOW: 180 MS
MDC_IDC_SET_BRADY_SAV_DELAY_LOW: 150 MS
MDC_IDC_SET_LEADCHNL_RA_PACING_AMPLITUDE: 1.5 V
MDC_IDC_SET_LEADCHNL_RA_PACING_ANODE_ELECTRODE_1: NORMAL
MDC_IDC_SET_LEADCHNL_RA_PACING_ANODE_LOCATION_1: NORMAL
MDC_IDC_SET_LEADCHNL_RA_PACING_CAPTURE_MODE: NORMAL
MDC_IDC_SET_LEADCHNL_RA_PACING_CATHODE_ELECTRODE_1: NORMAL
MDC_IDC_SET_LEADCHNL_RA_PACING_CATHODE_LOCATION_1: NORMAL
MDC_IDC_SET_LEADCHNL_RA_PACING_POLARITY: NORMAL
MDC_IDC_SET_LEADCHNL_RA_PACING_PULSEWIDTH: 0.4 MS
MDC_IDC_SET_LEADCHNL_RA_SENSING_ANODE_ELECTRODE_1: NORMAL
MDC_IDC_SET_LEADCHNL_RA_SENSING_ANODE_LOCATION_1: NORMAL
MDC_IDC_SET_LEADCHNL_RA_SENSING_CATHODE_ELECTRODE_1: NORMAL
MDC_IDC_SET_LEADCHNL_RA_SENSING_CATHODE_LOCATION_1: NORMAL
MDC_IDC_SET_LEADCHNL_RA_SENSING_POLARITY: NORMAL
MDC_IDC_SET_LEADCHNL_RA_SENSING_SENSITIVITY: 0.3 MV
MDC_IDC_SET_LEADCHNL_RV_PACING_AMPLITUDE: 1.5 V
MDC_IDC_SET_LEADCHNL_RV_PACING_ANODE_ELECTRODE_1: NORMAL
MDC_IDC_SET_LEADCHNL_RV_PACING_ANODE_LOCATION_1: NORMAL
MDC_IDC_SET_LEADCHNL_RV_PACING_CAPTURE_MODE: NORMAL
MDC_IDC_SET_LEADCHNL_RV_PACING_CATHODE_ELECTRODE_1: NORMAL
MDC_IDC_SET_LEADCHNL_RV_PACING_CATHODE_LOCATION_1: NORMAL
MDC_IDC_SET_LEADCHNL_RV_PACING_POLARITY: NORMAL
MDC_IDC_SET_LEADCHNL_RV_PACING_PULSEWIDTH: 0.4 MS
MDC_IDC_SET_LEADCHNL_RV_SENSING_ANODE_ELECTRODE_1: NORMAL
MDC_IDC_SET_LEADCHNL_RV_SENSING_ANODE_LOCATION_1: NORMAL
MDC_IDC_SET_LEADCHNL_RV_SENSING_CATHODE_ELECTRODE_1: NORMAL
MDC_IDC_SET_LEADCHNL_RV_SENSING_CATHODE_LOCATION_1: NORMAL
MDC_IDC_SET_LEADCHNL_RV_SENSING_POLARITY: NORMAL
MDC_IDC_SET_LEADCHNL_RV_SENSING_SENSITIVITY: 0.9 MV
MDC_IDC_SET_ZONE_DETECTION_INTERVAL: 200 MS
MDC_IDC_SET_ZONE_DETECTION_INTERVAL: 350 MS
MDC_IDC_SET_ZONE_DETECTION_INTERVAL: 400 MS
MDC_IDC_SET_ZONE_STATUS: NORMAL
MDC_IDC_SET_ZONE_TYPE: NORMAL
MDC_IDC_SET_ZONE_VENDOR_TYPE: NORMAL
MDC_IDC_STAT_AT_BURDEN_PERCENT: 0 %
MDC_IDC_STAT_AT_DTM_END: NORMAL
MDC_IDC_STAT_AT_DTM_START: NORMAL
MDC_IDC_STAT_BRADY_AP_VP_PERCENT: 0.81 %
MDC_IDC_STAT_BRADY_AP_VS_PERCENT: 22.7 %
MDC_IDC_STAT_BRADY_AS_VP_PERCENT: 2.18 %
MDC_IDC_STAT_BRADY_AS_VS_PERCENT: 74.31 %
MDC_IDC_STAT_BRADY_DTM_END: NORMAL
MDC_IDC_STAT_BRADY_DTM_START: NORMAL
MDC_IDC_STAT_BRADY_RA_PERCENT_PACED: 23.54 %
MDC_IDC_STAT_BRADY_RV_PERCENT_PACED: 2.99 %
MDC_IDC_STAT_EPISODE_RECENT_COUNT: 0
MDC_IDC_STAT_EPISODE_RECENT_COUNT: 2
MDC_IDC_STAT_EPISODE_RECENT_COUNT_DTM_END: NORMAL
MDC_IDC_STAT_EPISODE_RECENT_COUNT_DTM_START: NORMAL
MDC_IDC_STAT_EPISODE_TOTAL_COUNT: 0
MDC_IDC_STAT_EPISODE_TOTAL_COUNT: 0
MDC_IDC_STAT_EPISODE_TOTAL_COUNT: 1
MDC_IDC_STAT_EPISODE_TOTAL_COUNT: 27
MDC_IDC_STAT_EPISODE_TOTAL_COUNT: 9
MDC_IDC_STAT_EPISODE_TOTAL_COUNT_DTM_END: NORMAL
MDC_IDC_STAT_EPISODE_TOTAL_COUNT_DTM_START: NORMAL
MDC_IDC_STAT_EPISODE_TYPE: NORMAL
MDC_IDC_STAT_TACHYTHERAPY_RECENT_DTM_END: NORMAL
MDC_IDC_STAT_TACHYTHERAPY_RECENT_DTM_START: NORMAL
MDC_IDC_STAT_TACHYTHERAPY_TOTAL_DTM_END: NORMAL
MDC_IDC_STAT_TACHYTHERAPY_TOTAL_DTM_START: NORMAL

## 2024-02-21 PROCEDURE — 93294 REM INTERROG EVL PM/LDLS PM: CPT | Performed by: INTERNAL MEDICINE

## 2024-02-21 PROCEDURE — 93296 REM INTERROG EVL PM/IDS: CPT | Performed by: INTERNAL MEDICINE

## 2024-05-29 ENCOUNTER — ANCILLARY PROCEDURE (OUTPATIENT)
Dept: CARDIOLOGY | Facility: CLINIC | Age: 84
End: 2024-05-29
Attending: INTERNAL MEDICINE
Payer: COMMERCIAL

## 2024-05-29 DIAGNOSIS — Z95.810 ICD (IMPLANTABLE CARDIOVERTER-DEFIBRILLATOR) IN PLACE: ICD-10-CM

## 2024-05-29 DIAGNOSIS — Z95.0 CARDIAC PACEMAKER IN SITU: ICD-10-CM

## 2024-05-29 LAB
MDC_IDC_EPISODE_DTM: NORMAL
MDC_IDC_EPISODE_DURATION: 19 S
MDC_IDC_EPISODE_DURATION: 29 S
MDC_IDC_EPISODE_DURATION: 54 S
MDC_IDC_EPISODE_DURATION: 67 S
MDC_IDC_EPISODE_ID: 38
MDC_IDC_EPISODE_ID: 39
MDC_IDC_EPISODE_ID: 40
MDC_IDC_EPISODE_ID: 41
MDC_IDC_EPISODE_TYPE: NORMAL
MDC_IDC_LEAD_CONNECTION_STATUS: NORMAL
MDC_IDC_LEAD_CONNECTION_STATUS: NORMAL
MDC_IDC_LEAD_IMPLANT_DT: NORMAL
MDC_IDC_LEAD_IMPLANT_DT: NORMAL
MDC_IDC_LEAD_LOCATION: NORMAL
MDC_IDC_LEAD_LOCATION: NORMAL
MDC_IDC_LEAD_LOCATION_DETAIL_1: NORMAL
MDC_IDC_LEAD_LOCATION_DETAIL_1: NORMAL
MDC_IDC_LEAD_MFG: NORMAL
MDC_IDC_LEAD_MFG: NORMAL
MDC_IDC_LEAD_MODEL: NORMAL
MDC_IDC_LEAD_MODEL: NORMAL
MDC_IDC_LEAD_POLARITY_TYPE: NORMAL
MDC_IDC_LEAD_POLARITY_TYPE: NORMAL
MDC_IDC_LEAD_SERIAL: NORMAL
MDC_IDC_LEAD_SERIAL: NORMAL
MDC_IDC_LEAD_SPECIAL_FUNCTION: NORMAL
MDC_IDC_LEAD_SPECIAL_FUNCTION: NORMAL
MDC_IDC_MSMT_BATTERY_DTM: NORMAL
MDC_IDC_MSMT_BATTERY_REMAINING_LONGEVITY: 93 MO
MDC_IDC_MSMT_BATTERY_RRT_TRIGGER: 2.62
MDC_IDC_MSMT_BATTERY_STATUS: NORMAL
MDC_IDC_MSMT_BATTERY_VOLTAGE: 2.98 V
MDC_IDC_MSMT_LEADCHNL_RA_IMPEDANCE_VALUE: 304 OHM
MDC_IDC_MSMT_LEADCHNL_RA_IMPEDANCE_VALUE: 456 OHM
MDC_IDC_MSMT_LEADCHNL_RA_PACING_THRESHOLD_AMPLITUDE: 0.62 V
MDC_IDC_MSMT_LEADCHNL_RA_PACING_THRESHOLD_PULSEWIDTH: 0.4 MS
MDC_IDC_MSMT_LEADCHNL_RA_SENSING_INTR_AMPL: 3.25 MV
MDC_IDC_MSMT_LEADCHNL_RA_SENSING_INTR_AMPL: 3.25 MV
MDC_IDC_MSMT_LEADCHNL_RV_IMPEDANCE_VALUE: 323 OHM
MDC_IDC_MSMT_LEADCHNL_RV_IMPEDANCE_VALUE: 437 OHM
MDC_IDC_MSMT_LEADCHNL_RV_PACING_THRESHOLD_AMPLITUDE: 0.88 V
MDC_IDC_MSMT_LEADCHNL_RV_PACING_THRESHOLD_PULSEWIDTH: 0.4 MS
MDC_IDC_MSMT_LEADCHNL_RV_SENSING_INTR_AMPL: 1.75 MV
MDC_IDC_MSMT_LEADCHNL_RV_SENSING_INTR_AMPL: 1.75 MV
MDC_IDC_PG_IMPLANT_DTM: NORMAL
MDC_IDC_PG_MFG: NORMAL
MDC_IDC_PG_MODEL: NORMAL
MDC_IDC_PG_SERIAL: NORMAL
MDC_IDC_PG_TYPE: NORMAL
MDC_IDC_SESS_CLINIC_NAME: NORMAL
MDC_IDC_SESS_DTM: NORMAL
MDC_IDC_SESS_TYPE: NORMAL
MDC_IDC_SET_BRADY_AT_MODE_SWITCH_RATE: 171 {BEATS}/MIN
MDC_IDC_SET_BRADY_HYSTRATE: NORMAL
MDC_IDC_SET_BRADY_LOWRATE: 60 {BEATS}/MIN
MDC_IDC_SET_BRADY_MAX_SENSOR_RATE: 130 {BEATS}/MIN
MDC_IDC_SET_BRADY_MAX_TRACKING_RATE: 130 {BEATS}/MIN
MDC_IDC_SET_BRADY_MODE: NORMAL
MDC_IDC_SET_BRADY_PAV_DELAY_LOW: 180 MS
MDC_IDC_SET_BRADY_SAV_DELAY_LOW: 150 MS
MDC_IDC_SET_LEADCHNL_RA_PACING_AMPLITUDE: 1.5 V
MDC_IDC_SET_LEADCHNL_RA_PACING_ANODE_ELECTRODE_1: NORMAL
MDC_IDC_SET_LEADCHNL_RA_PACING_ANODE_LOCATION_1: NORMAL
MDC_IDC_SET_LEADCHNL_RA_PACING_CAPTURE_MODE: NORMAL
MDC_IDC_SET_LEADCHNL_RA_PACING_CATHODE_ELECTRODE_1: NORMAL
MDC_IDC_SET_LEADCHNL_RA_PACING_CATHODE_LOCATION_1: NORMAL
MDC_IDC_SET_LEADCHNL_RA_PACING_POLARITY: NORMAL
MDC_IDC_SET_LEADCHNL_RA_PACING_PULSEWIDTH: 0.4 MS
MDC_IDC_SET_LEADCHNL_RA_SENSING_ANODE_ELECTRODE_1: NORMAL
MDC_IDC_SET_LEADCHNL_RA_SENSING_ANODE_LOCATION_1: NORMAL
MDC_IDC_SET_LEADCHNL_RA_SENSING_CATHODE_ELECTRODE_1: NORMAL
MDC_IDC_SET_LEADCHNL_RA_SENSING_CATHODE_LOCATION_1: NORMAL
MDC_IDC_SET_LEADCHNL_RA_SENSING_POLARITY: NORMAL
MDC_IDC_SET_LEADCHNL_RA_SENSING_SENSITIVITY: 0.3 MV
MDC_IDC_SET_LEADCHNL_RV_PACING_AMPLITUDE: 1.5 V
MDC_IDC_SET_LEADCHNL_RV_PACING_ANODE_ELECTRODE_1: NORMAL
MDC_IDC_SET_LEADCHNL_RV_PACING_ANODE_LOCATION_1: NORMAL
MDC_IDC_SET_LEADCHNL_RV_PACING_CAPTURE_MODE: NORMAL
MDC_IDC_SET_LEADCHNL_RV_PACING_CATHODE_ELECTRODE_1: NORMAL
MDC_IDC_SET_LEADCHNL_RV_PACING_CATHODE_LOCATION_1: NORMAL
MDC_IDC_SET_LEADCHNL_RV_PACING_POLARITY: NORMAL
MDC_IDC_SET_LEADCHNL_RV_PACING_PULSEWIDTH: 0.4 MS
MDC_IDC_SET_LEADCHNL_RV_SENSING_ANODE_ELECTRODE_1: NORMAL
MDC_IDC_SET_LEADCHNL_RV_SENSING_ANODE_LOCATION_1: NORMAL
MDC_IDC_SET_LEADCHNL_RV_SENSING_CATHODE_ELECTRODE_1: NORMAL
MDC_IDC_SET_LEADCHNL_RV_SENSING_CATHODE_LOCATION_1: NORMAL
MDC_IDC_SET_LEADCHNL_RV_SENSING_POLARITY: NORMAL
MDC_IDC_SET_LEADCHNL_RV_SENSING_SENSITIVITY: 0.9 MV
MDC_IDC_SET_ZONE_DETECTION_INTERVAL: 200 MS
MDC_IDC_SET_ZONE_DETECTION_INTERVAL: 350 MS
MDC_IDC_SET_ZONE_DETECTION_INTERVAL: 400 MS
MDC_IDC_SET_ZONE_STATUS: NORMAL
MDC_IDC_SET_ZONE_TYPE: NORMAL
MDC_IDC_SET_ZONE_VENDOR_TYPE: NORMAL
MDC_IDC_STAT_AT_BURDEN_PERCENT: 0 %
MDC_IDC_STAT_AT_DTM_END: NORMAL
MDC_IDC_STAT_AT_DTM_START: NORMAL
MDC_IDC_STAT_BRADY_AP_VP_PERCENT: 17.43 %
MDC_IDC_STAT_BRADY_AP_VS_PERCENT: 11.91 %
MDC_IDC_STAT_BRADY_AS_VP_PERCENT: 40.77 %
MDC_IDC_STAT_BRADY_AS_VS_PERCENT: 29.89 %
MDC_IDC_STAT_BRADY_DTM_END: NORMAL
MDC_IDC_STAT_BRADY_DTM_START: NORMAL
MDC_IDC_STAT_BRADY_RA_PERCENT_PACED: 29.2 %
MDC_IDC_STAT_BRADY_RV_PERCENT_PACED: 58.21 %
MDC_IDC_STAT_EPISODE_RECENT_COUNT: 0
MDC_IDC_STAT_EPISODE_RECENT_COUNT: 4
MDC_IDC_STAT_EPISODE_RECENT_COUNT_DTM_END: NORMAL
MDC_IDC_STAT_EPISODE_RECENT_COUNT_DTM_START: NORMAL
MDC_IDC_STAT_EPISODE_TOTAL_COUNT: 0
MDC_IDC_STAT_EPISODE_TOTAL_COUNT: 0
MDC_IDC_STAT_EPISODE_TOTAL_COUNT: 1
MDC_IDC_STAT_EPISODE_TOTAL_COUNT: 31
MDC_IDC_STAT_EPISODE_TOTAL_COUNT: 9
MDC_IDC_STAT_EPISODE_TOTAL_COUNT_DTM_END: NORMAL
MDC_IDC_STAT_EPISODE_TOTAL_COUNT_DTM_START: NORMAL
MDC_IDC_STAT_EPISODE_TYPE: NORMAL
MDC_IDC_STAT_TACHYTHERAPY_RECENT_DTM_END: NORMAL
MDC_IDC_STAT_TACHYTHERAPY_RECENT_DTM_START: NORMAL
MDC_IDC_STAT_TACHYTHERAPY_TOTAL_DTM_END: NORMAL
MDC_IDC_STAT_TACHYTHERAPY_TOTAL_DTM_START: NORMAL

## 2024-05-29 PROCEDURE — 93294 REM INTERROG EVL PM/LDLS PM: CPT | Performed by: INTERNAL MEDICINE

## 2024-05-29 PROCEDURE — 93296 REM INTERROG EVL PM/IDS: CPT | Performed by: INTERNAL MEDICINE

## 2024-09-06 ENCOUNTER — ANCILLARY PROCEDURE (OUTPATIENT)
Dept: CARDIOLOGY | Facility: CLINIC | Age: 84
End: 2024-09-06
Attending: INTERNAL MEDICINE
Payer: COMMERCIAL

## 2024-09-06 DIAGNOSIS — Z95.0 CARDIAC PACEMAKER IN SITU: ICD-10-CM

## 2024-09-06 DIAGNOSIS — Z95.810 ICD (IMPLANTABLE CARDIOVERTER-DEFIBRILLATOR) IN PLACE: ICD-10-CM

## 2024-09-29 LAB
MDC_IDC_EPISODE_DTM: NORMAL
MDC_IDC_EPISODE_DURATION: 104 S
MDC_IDC_EPISODE_DURATION: 123 S
MDC_IDC_EPISODE_DURATION: 2 S
MDC_IDC_EPISODE_DURATION: 373 S
MDC_IDC_EPISODE_DURATION: 42 S
MDC_IDC_EPISODE_DURATION: 44 S
MDC_IDC_EPISODE_DURATION: 501 S
MDC_IDC_EPISODE_DURATION: 5113 S
MDC_IDC_EPISODE_DURATION: 63 S
MDC_IDC_EPISODE_DURATION: 65 S
MDC_IDC_EPISODE_DURATION: 75 S
MDC_IDC_EPISODE_ID: 42
MDC_IDC_EPISODE_ID: 43
MDC_IDC_EPISODE_ID: 44
MDC_IDC_EPISODE_ID: 45
MDC_IDC_EPISODE_ID: 46
MDC_IDC_EPISODE_ID: 47
MDC_IDC_EPISODE_ID: 48
MDC_IDC_EPISODE_ID: 49
MDC_IDC_EPISODE_ID: 50
MDC_IDC_EPISODE_ID: 51
MDC_IDC_EPISODE_ID: 52
MDC_IDC_EPISODE_TYPE: NORMAL
MDC_IDC_LEAD_CONNECTION_STATUS: NORMAL
MDC_IDC_LEAD_CONNECTION_STATUS: NORMAL
MDC_IDC_LEAD_IMPLANT_DT: NORMAL
MDC_IDC_LEAD_IMPLANT_DT: NORMAL
MDC_IDC_LEAD_LOCATION: NORMAL
MDC_IDC_LEAD_LOCATION: NORMAL
MDC_IDC_LEAD_LOCATION_DETAIL_1: NORMAL
MDC_IDC_LEAD_LOCATION_DETAIL_1: NORMAL
MDC_IDC_LEAD_MFG: NORMAL
MDC_IDC_LEAD_MFG: NORMAL
MDC_IDC_LEAD_MODEL: NORMAL
MDC_IDC_LEAD_MODEL: NORMAL
MDC_IDC_LEAD_POLARITY_TYPE: NORMAL
MDC_IDC_LEAD_POLARITY_TYPE: NORMAL
MDC_IDC_LEAD_SERIAL: NORMAL
MDC_IDC_LEAD_SERIAL: NORMAL
MDC_IDC_LEAD_SPECIAL_FUNCTION: NORMAL
MDC_IDC_LEAD_SPECIAL_FUNCTION: NORMAL
MDC_IDC_MSMT_BATTERY_DTM: NORMAL
MDC_IDC_MSMT_BATTERY_REMAINING_LONGEVITY: 88 MO
MDC_IDC_MSMT_BATTERY_RRT_TRIGGER: 2.62
MDC_IDC_MSMT_BATTERY_STATUS: NORMAL
MDC_IDC_MSMT_BATTERY_VOLTAGE: 2.98 V
MDC_IDC_MSMT_LEADCHNL_RA_IMPEDANCE_VALUE: 304 OHM
MDC_IDC_MSMT_LEADCHNL_RA_IMPEDANCE_VALUE: 456 OHM
MDC_IDC_MSMT_LEADCHNL_RA_PACING_THRESHOLD_AMPLITUDE: 0.62 V
MDC_IDC_MSMT_LEADCHNL_RA_PACING_THRESHOLD_PULSEWIDTH: 0.4 MS
MDC_IDC_MSMT_LEADCHNL_RA_SENSING_INTR_AMPL: 3.75 MV
MDC_IDC_MSMT_LEADCHNL_RA_SENSING_INTR_AMPL: 3.75 MV
MDC_IDC_MSMT_LEADCHNL_RV_IMPEDANCE_VALUE: 323 OHM
MDC_IDC_MSMT_LEADCHNL_RV_IMPEDANCE_VALUE: 399 OHM
MDC_IDC_MSMT_LEADCHNL_RV_PACING_THRESHOLD_AMPLITUDE: 0.75 V
MDC_IDC_MSMT_LEADCHNL_RV_PACING_THRESHOLD_PULSEWIDTH: 0.4 MS
MDC_IDC_MSMT_LEADCHNL_RV_SENSING_INTR_AMPL: 2 MV
MDC_IDC_MSMT_LEADCHNL_RV_SENSING_INTR_AMPL: 2 MV
MDC_IDC_PG_IMPLANT_DTM: NORMAL
MDC_IDC_PG_MFG: NORMAL
MDC_IDC_PG_MODEL: NORMAL
MDC_IDC_PG_SERIAL: NORMAL
MDC_IDC_PG_TYPE: NORMAL
MDC_IDC_SESS_CLINIC_NAME: NORMAL
MDC_IDC_SESS_DTM: NORMAL
MDC_IDC_SESS_TYPE: NORMAL
MDC_IDC_SET_BRADY_AT_MODE_SWITCH_RATE: 171 {BEATS}/MIN
MDC_IDC_SET_BRADY_HYSTRATE: NORMAL
MDC_IDC_SET_BRADY_LOWRATE: 60 {BEATS}/MIN
MDC_IDC_SET_BRADY_MAX_SENSOR_RATE: 130 {BEATS}/MIN
MDC_IDC_SET_BRADY_MAX_TRACKING_RATE: 130 {BEATS}/MIN
MDC_IDC_SET_BRADY_MODE: NORMAL
MDC_IDC_SET_BRADY_PAV_DELAY_LOW: 180 MS
MDC_IDC_SET_BRADY_SAV_DELAY_LOW: 150 MS
MDC_IDC_SET_LEADCHNL_RA_PACING_AMPLITUDE: 1.5 V
MDC_IDC_SET_LEADCHNL_RA_PACING_ANODE_ELECTRODE_1: NORMAL
MDC_IDC_SET_LEADCHNL_RA_PACING_ANODE_LOCATION_1: NORMAL
MDC_IDC_SET_LEADCHNL_RA_PACING_CAPTURE_MODE: NORMAL
MDC_IDC_SET_LEADCHNL_RA_PACING_CATHODE_ELECTRODE_1: NORMAL
MDC_IDC_SET_LEADCHNL_RA_PACING_CATHODE_LOCATION_1: NORMAL
MDC_IDC_SET_LEADCHNL_RA_PACING_POLARITY: NORMAL
MDC_IDC_SET_LEADCHNL_RA_PACING_PULSEWIDTH: 0.4 MS
MDC_IDC_SET_LEADCHNL_RA_SENSING_ANODE_ELECTRODE_1: NORMAL
MDC_IDC_SET_LEADCHNL_RA_SENSING_ANODE_LOCATION_1: NORMAL
MDC_IDC_SET_LEADCHNL_RA_SENSING_CATHODE_ELECTRODE_1: NORMAL
MDC_IDC_SET_LEADCHNL_RA_SENSING_CATHODE_LOCATION_1: NORMAL
MDC_IDC_SET_LEADCHNL_RA_SENSING_POLARITY: NORMAL
MDC_IDC_SET_LEADCHNL_RA_SENSING_SENSITIVITY: 0.3 MV
MDC_IDC_SET_LEADCHNL_RV_PACING_AMPLITUDE: 1.5 V
MDC_IDC_SET_LEADCHNL_RV_PACING_ANODE_ELECTRODE_1: NORMAL
MDC_IDC_SET_LEADCHNL_RV_PACING_ANODE_LOCATION_1: NORMAL
MDC_IDC_SET_LEADCHNL_RV_PACING_CAPTURE_MODE: NORMAL
MDC_IDC_SET_LEADCHNL_RV_PACING_CATHODE_ELECTRODE_1: NORMAL
MDC_IDC_SET_LEADCHNL_RV_PACING_CATHODE_LOCATION_1: NORMAL
MDC_IDC_SET_LEADCHNL_RV_PACING_POLARITY: NORMAL
MDC_IDC_SET_LEADCHNL_RV_PACING_PULSEWIDTH: 0.4 MS
MDC_IDC_SET_LEADCHNL_RV_SENSING_ANODE_ELECTRODE_1: NORMAL
MDC_IDC_SET_LEADCHNL_RV_SENSING_ANODE_LOCATION_1: NORMAL
MDC_IDC_SET_LEADCHNL_RV_SENSING_CATHODE_ELECTRODE_1: NORMAL
MDC_IDC_SET_LEADCHNL_RV_SENSING_CATHODE_LOCATION_1: NORMAL
MDC_IDC_SET_LEADCHNL_RV_SENSING_POLARITY: NORMAL
MDC_IDC_SET_LEADCHNL_RV_SENSING_SENSITIVITY: 0.9 MV
MDC_IDC_SET_ZONE_DETECTION_INTERVAL: 200 MS
MDC_IDC_SET_ZONE_DETECTION_INTERVAL: 350 MS
MDC_IDC_SET_ZONE_DETECTION_INTERVAL: 400 MS
MDC_IDC_SET_ZONE_STATUS: NORMAL
MDC_IDC_SET_ZONE_TYPE: NORMAL
MDC_IDC_SET_ZONE_VENDOR_TYPE: NORMAL
MDC_IDC_STAT_AT_BURDEN_PERCENT: 0.1 %
MDC_IDC_STAT_AT_DTM_END: NORMAL
MDC_IDC_STAT_AT_DTM_START: NORMAL
MDC_IDC_STAT_BRADY_AP_VP_PERCENT: 26.64 %
MDC_IDC_STAT_BRADY_AP_VS_PERCENT: 3.99 %
MDC_IDC_STAT_BRADY_AS_VP_PERCENT: 62.97 %
MDC_IDC_STAT_BRADY_AS_VS_PERCENT: 6.4 %
MDC_IDC_STAT_BRADY_DTM_END: NORMAL
MDC_IDC_STAT_BRADY_DTM_START: NORMAL
MDC_IDC_STAT_BRADY_RA_PERCENT_PACED: 30.51 %
MDC_IDC_STAT_BRADY_RV_PERCENT_PACED: 89.62 %
MDC_IDC_STAT_EPISODE_RECENT_COUNT: 0
MDC_IDC_STAT_EPISODE_RECENT_COUNT: 1
MDC_IDC_STAT_EPISODE_RECENT_COUNT: 10
MDC_IDC_STAT_EPISODE_RECENT_COUNT_DTM_END: NORMAL
MDC_IDC_STAT_EPISODE_RECENT_COUNT_DTM_START: NORMAL
MDC_IDC_STAT_EPISODE_TOTAL_COUNT: 0
MDC_IDC_STAT_EPISODE_TOTAL_COUNT: 0
MDC_IDC_STAT_EPISODE_TOTAL_COUNT: 1
MDC_IDC_STAT_EPISODE_TOTAL_COUNT: 10
MDC_IDC_STAT_EPISODE_TOTAL_COUNT: 41
MDC_IDC_STAT_EPISODE_TOTAL_COUNT_DTM_END: NORMAL
MDC_IDC_STAT_EPISODE_TOTAL_COUNT_DTM_START: NORMAL
MDC_IDC_STAT_EPISODE_TYPE: NORMAL
MDC_IDC_STAT_TACHYTHERAPY_RECENT_DTM_END: NORMAL
MDC_IDC_STAT_TACHYTHERAPY_RECENT_DTM_START: NORMAL
MDC_IDC_STAT_TACHYTHERAPY_TOTAL_DTM_END: NORMAL
MDC_IDC_STAT_TACHYTHERAPY_TOTAL_DTM_START: NORMAL

## 2024-09-29 PROCEDURE — 93294 REM INTERROG EVL PM/LDLS PM: CPT | Performed by: INTERNAL MEDICINE

## 2024-09-29 PROCEDURE — 93296 REM INTERROG EVL PM/IDS: CPT | Performed by: INTERNAL MEDICINE

## 2024-10-25 ENCOUNTER — TRANSFERRED RECORDS (OUTPATIENT)
Dept: HEALTH INFORMATION MANAGEMENT | Facility: CLINIC | Age: 84
End: 2024-10-25
Payer: COMMERCIAL

## 2024-11-01 ENCOUNTER — OFFICE VISIT (OUTPATIENT)
Dept: CARDIOLOGY | Facility: CLINIC | Age: 84
End: 2024-11-01
Payer: COMMERCIAL

## 2024-11-01 ENCOUNTER — ANCILLARY PROCEDURE (OUTPATIENT)
Dept: CARDIOLOGY | Facility: CLINIC | Age: 84
End: 2024-11-01
Attending: INTERNAL MEDICINE
Payer: COMMERCIAL

## 2024-11-01 VITALS
BODY MASS INDEX: 21.45 KG/M2 | WEIGHT: 123 LBS | SYSTOLIC BLOOD PRESSURE: 126 MMHG | HEART RATE: 80 BPM | DIASTOLIC BLOOD PRESSURE: 76 MMHG | RESPIRATION RATE: 14 BRPM

## 2024-11-01 DIAGNOSIS — Z95.0 CARDIAC PACEMAKER IN SITU: ICD-10-CM

## 2024-11-01 DIAGNOSIS — I44.1 MOBITZ TYPE 2 SECOND DEGREE ATRIOVENTRICULAR BLOCK: ICD-10-CM

## 2024-11-01 DIAGNOSIS — Z95.810 ICD (IMPLANTABLE CARDIOVERTER-DEFIBRILLATOR) IN PLACE: ICD-10-CM

## 2024-11-01 DIAGNOSIS — E78.2 MIXED HYPERLIPIDEMIA: ICD-10-CM

## 2024-11-01 DIAGNOSIS — I48.0 PAROXYSMAL ATRIAL FIBRILLATION (H): Primary | ICD-10-CM

## 2024-11-01 PROCEDURE — 99213 OFFICE O/P EST LOW 20 MIN: CPT | Performed by: INTERNAL MEDICINE

## 2024-11-01 RX ORDER — PREDNISONE 20 MG/1
20 TABLET ORAL DAILY
COMMUNITY
Start: 2024-10-29 | End: 2024-11-03

## 2024-11-01 NOTE — LETTER
11/1/2024    Sarah Boyer MD  [address]  [facility] [address]    RE: Alycia Duvall       Dear Colleague,     I had the pleasure of seeing Alycia Duvall in the [facility].            Assessment/Plan:   1.  Paroxysmal atrial fibrillation: The patient developed 1 episode of atrial fibrillation 1 hour and 20 minutes 1 month ago.  She was asymptomatic.  Her atrial fibrillation burden is very low.  She had second-degree AV block.  Her ventricular rate has between 60 and 110 bpm per pacemaker interrogation.  No indication to start AV mee blockade agents at this time.  Continue to monitor.  Her [id number] score is 3.  Continue Eliquis 5 mg twice a day.  We discussed the Watchman device placement instead of long-term anticoagulation. The patient will think about it and let us know.  She did not have echocardiogram since 2018.  Due to her recent episode of atrial fibrillation lasted 1 hour and 20 minutes, mild shortness of breath and fatigue, echocardiogram is requested for evaluation of heart function and structure, especially to rule out pacemaker induced cardiomyopathy.     2.  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  Continue to follow-up with device clinic.     3.  Mixed hyperlipidemia: On Lipitor 10 mg at bedtime.  Her LDL was well controlled.    Thank you for the opportunity to be involved in the care of Alycia Duvall. If you have any questions, please feel free to contact me.  I will see the patient again in 12 months and as needed.    Much or all of the text in this note was generated through the use of Dragon Dictate voice-to-text software. Errors in spelling or words which seem out of context are unintentional. Sound alike errors, in particular, may have escaped editing.       History of Present Illness:   It is my pleasure to see Alycia ERWIN  Eloina at the [facility] for routine cardiology follow up and pacemaker interrogation.  Alycia Duvall is a 83 year old female with a medical history of Mobitz 2 second-degree AV block status post a dual-chamber pacemaker placement in April 2018, dyslipidemia and paroxysmal atrial fibrillation.     The patient recently got possible viral infection.  She complains of her shortness of breath on exertion and fatigue.  She treated with antibiotics and prednisone.  She feels better now.  She denies chest pain, palpitations, dizziness, orthopnea, PND or leg edema.  Her blood pressure and heart rate are in normal range.  No side effects of Eliquis.     Pacemaker interrogation today was reported 1 episode of atrial fibrillation 1 month ago, lasted 1 hour and 20 minutes.  She did not have palpitation at that time.    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 Her smoking use included cigarettes. She has never used smokeless tobacco.    Review of Systems:   12 systems are reviewed negative except for in HPI.    Meds:     Current Outpatient Medications:      acetaminophen (TYLENOL) 325 MG tablet, [ACETAMINOPHEN (TYLENOL) 325 MG TABLET] Take 650 mg by mouth as needed., Disp: , Rfl:      atorvastatin (LIPITOR) 10 MG tablet, [ATORVASTATIN (LIPITOR) 10 MG TABLET] Take 10 mg by mouth daily.,  Disp: , Rfl:      calcium carbonate-vitamin D3 (OS-TOD) 500-100 mg-unit chewable tablet, [CALCIUM CARBONATE-VITAMIN D3 (OS-TOD) 500-100 MG-UNIT CHEWABLE TABLET] Chew 500 mg daily., Disp: , Rfl:      cholecalciferol, vitamin D3, 5,000 unit Tab, [CHOLECALCIFEROL, VITAMIN D3, 5,000 UNIT TAB] Take 5,000 Units by mouth., Disp: , Rfl:      ELIQUIS 5 mg Tab tablet, [ELIQUIS 5 MG TAB TABLET] Take 1 tablet (5 mg total) by mouth 2 (two) times a day., Disp: 180 tablet, Rfl: 2     estradiol (ESTRACE) 0.5 MG tablet, [ESTRADIOL (ESTRACE) 0.5 MG TABLET] Take 1 mg by mouth daily., Disp: , Rfl:      omega 3-dha-epa-fish oil (FISH OIL) 60- mg cap capsule, [OMEGA 3-DHA-EPA-FISH OIL (FISH OIL) 60- MG CAP CAPSULE] Take 1,000 mg by mouth daily., Disp: , Rfl:      predniSONE (DELTASONE) 20 MG tablet, Take 20 mg by mouth daily., Disp: , Rfl:      zolpidem (AMBIEN) 10 MG tablet, take 1 tablet by mouth once daily at bedtime as needed, Disp: , Rfl:     Allergies:   Erythromycin      Objective:      Physical Exam  55.8 kg (123 lb)     Body mass index is 21.45 kg/m .  /76 (BP Location: Right arm, Patient Position: Sitting, Cuff Size: Adult Regular)   Pulse 80   Resp 14   Wt 55.8 kg (123 lb)   BMI 21.45 kg/m      General Appearance:   Awake, Alert, No acute distress.   HEENT:  Pupil equal and reactive to light. No scleral icterus; the mucous membranes were moist.   Neck: No cervical bruits. No JVD. No thyromegaly.     Chest: The spine was straight. The chest was symmetric.   Lungs:   Respirations unlabored; Lungs are clear to auscultation. No crackles. No wheezing.   Cardiovascular:   Regular rhythm and rate, normal first and second heart sounds with no murmurs. No rubs or gallops.    Abdomen:  Soft. No tenderness. Non-distended. Bowels sounds are present   Extremities: Equal tibial pulses. No leg edema.   Skin: No rashes or ulcers. Warm, Dry.   Musculoskeletal: No tenderness. No deformity.   Neurologic: Mood and affect are  "appropriate. No focal deficits.         Pacemaker interrogation today: Personally reviewed  Normal device function  1 episode of atrial fibrillation lasted 1 hour 20 minutes, atrial fibrillation burden less than 0.1%    Cardiac Imaging Studies  ECHO from [facility] on 4-:   CONCLUSION:    Normal LV size and systolic function.     Mild concentric LVH.     Normal RV size and function.     Normal echo    No previous for comparison    Left Ventricular Ejection Fraction: 60 %     Lab Review   Lab Results   Component Value Date     06/10/2019    CO2 31 06/10/2019    BUN 13 06/10/2019     No results found for: \"WBC\", \"HGB\", \"HCT\", \"MCV\", \"PLT\"  Lab Results   Component Value Date    CHOL 151 02/08/2024    CHOL 157 06/10/2019    CHOL 193 11/08/2017     Lab Results   Component Value Date    HDL 84 02/08/2024    HDL 67 06/10/2019    HDL 63 11/08/2017     No components found for: \"LDLCALC\"  Lab Results   Component Value Date    TRIG 77 02/08/2024    TRIG 134 06/10/2019    TRIG 137 11/08/2017                   Thank you for allowing me to participate in the care of your patient.      Sincerely,     Trinity Banerjee MD     [facility]  cc:   Referred MD Ranjan  No address on file      "

## 2024-11-01 NOTE — PROGRESS NOTES
Assessment/Plan:   1.  Paroxysmal atrial fibrillation: The patient developed 1 episode of atrial fibrillation 1 hour and 20 minutes 1 month ago.  She was asymptomatic.  Her atrial fibrillation burden is very low.  She had second-degree AV block.  Her ventricular rate has between 60 and 110 bpm per pacemaker interrogation.  No indication to start AV mee blockade agents at this time.  Continue to monitor.  Her [id number] score is 3.  Continue Eliquis 5 mg twice a day.  We discussed the Watchman device placement instead of long-term anticoagulation. The patient will think about it and let us know.  She did not have echocardiogram since 2018.  Due to her recent episode of atrial fibrillation lasted 1 hour and 20 minutes, mild shortness of breath and fatigue, echocardiogram is requested for evaluation of heart function and structure, especially to rule out pacemaker induced cardiomyopathy.     2.  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  Continue to follow-up with device clinic.     3.  Mixed hyperlipidemia: On Lipitor 10 mg at bedtime.  Her LDL was well controlled.    Thank you for the opportunity to be involved in the care of Alycia Duvall. If you have any questions, please feel free to contact me.  I will see the patient again in 12 months and as needed.    Much or all of the text in this note was generated through the use of Dragon Dictate voice-to-text software. Errors in spelling or words which seem out of context are unintentional. Sound alike errors, in particular, may have escaped editing.       History of Present Illness:   It is my pleasure to see Alycia Duvall at the [facility] clinic for routine cardiology follow up and pacemaker interrogation.  Alycia Duvall is a 83 year old female with a medical history of Mobitz 2 second-degree AV block  status post a dual-chamber pacemaker placement in April 2018, dyslipidemia and paroxysmal atrial fibrillation.     The patient recently got possible viral infection.  She complains of her shortness of breath on exertion and fatigue.  She treated with antibiotics and prednisone.  She feels better now.  She denies chest pain, palpitations, dizziness, orthopnea, PND or leg edema.  Her blood pressure and heart rate are in normal range.  No side effects of Eliquis.     Pacemaker interrogation today was reported 1 episode of atrial fibrillation 1 month ago, lasted 1 hour and 20 minutes.  She did not have palpitation at that time.    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 Her smoking use included cigarettes. She has never used smokeless tobacco.    Review of Systems:   12 systems are reviewed negative except for in HPI.    Meds:     Current Outpatient Medications:     acetaminophen (TYLENOL) 325 MG tablet, [ACETAMINOPHEN (TYLENOL) 325 MG TABLET] Take 650 mg by mouth as needed., Disp: , Rfl:     atorvastatin (LIPITOR) 10 MG tablet, [ATORVASTATIN (LIPITOR) 10 MG TABLET] Take 10 mg by mouth daily., Disp: , Rfl:     calcium carbonate-vitamin D3 (OS-TOD) 500-100 mg-unit chewable tablet, [CALCIUM CARBONATE-VITAMIN D3 (OS-TOD) 500-100 MG-UNIT CHEWABLE TABLET] Chew 500 mg daily., Disp: , Rfl:     cholecalciferol, vitamin D3,  5,000 unit Tab, [CHOLECALCIFEROL, VITAMIN D3, 5,000 UNIT TAB] Take 5,000 Units by mouth., Disp: , Rfl:     ELIQUIS 5 mg Tab tablet, [ELIQUIS 5 MG TAB TABLET] Take 1 tablet (5 mg total) by mouth 2 (two) times a day., Disp: 180 tablet, Rfl: 2    estradiol (ESTRACE) 0.5 MG tablet, [ESTRADIOL (ESTRACE) 0.5 MG TABLET] Take 1 mg by mouth daily., Disp: , Rfl:     omega 3-dha-epa-fish oil (FISH OIL) 60- mg cap capsule, [OMEGA 3-DHA-EPA-FISH OIL (FISH OIL) 60- MG CAP CAPSULE] Take 1,000 mg by mouth daily., Disp: , Rfl:     predniSONE (DELTASONE) 20 MG tablet, Take 20 mg by mouth daily., Disp: , Rfl:     zolpidem (AMBIEN) 10 MG tablet, take 1 tablet by mouth once daily at bedtime as needed, Disp: , Rfl:     Allergies:   Erythromycin      Objective:      Physical Exam  55.8 kg (123 lb)     Body mass index is 21.45 kg/m .  /76 (BP Location: Right arm, Patient Position: Sitting, Cuff Size: Adult Regular)   Pulse 80   Resp 14   Wt 55.8 kg (123 lb)   BMI 21.45 kg/m      General Appearance:   Awake, Alert, No acute distress.   HEENT:  Pupil equal and reactive to light. No scleral icterus; the mucous membranes were moist.   Neck: No cervical bruits. No JVD. No thyromegaly.     Chest: The spine was straight. The chest was symmetric.   Lungs:   Respirations unlabored; Lungs are clear to auscultation. No crackles. No wheezing.   Cardiovascular:   Regular rhythm and rate, normal first and second heart sounds with no murmurs. No rubs or gallops.    Abdomen:  Soft. No tenderness. Non-distended. Bowels sounds are present   Extremities: Equal tibial pulses. No leg edema.   Skin: No rashes or ulcers. Warm, Dry.   Musculoskeletal: No tenderness. No deformity.   Neurologic: Mood and affect are appropriate. No focal deficits.         Pacemaker interrogation today: Personally reviewed  Normal device function  1 episode of atrial fibrillation lasted 1 hour 20 minutes, atrial fibrillation burden less than 0.1%    Cardiac  "Imaging Studies  ECHO from [facility] on 4-:   CONCLUSION:    Normal LV size and systolic function.     Mild concentric LVH.     Normal RV size and function.     Normal echo    No previous for comparison    Left Ventricular Ejection Fraction: 60 %     Lab Review   Lab Results   Component Value Date     06/10/2019    CO2 31 06/10/2019    BUN 13 06/10/2019     No results found for: \"WBC\", \"HGB\", \"HCT\", \"MCV\", \"PLT\"  Lab Results   Component Value Date    CHOL 151 02/08/2024    CHOL 157 06/10/2019    CHOL 193 11/08/2017     Lab Results   Component Value Date    HDL 84 02/08/2024    HDL 67 06/10/2019    HDL 63 11/08/2017     No components found for: \"LDLCALC\"  Lab Results   Component Value Date    TRIG 77 02/08/2024    TRIG 134 06/10/2019    TRIG 137 11/08/2017               "

## 2024-11-06 LAB
MDC_IDC_EPISODE_DTM: NORMAL
MDC_IDC_EPISODE_DURATION: 0 S
MDC_IDC_EPISODE_DURATION: 1056 S
MDC_IDC_EPISODE_DURATION: 290 S
MDC_IDC_EPISODE_ID: 53
MDC_IDC_EPISODE_ID: 54
MDC_IDC_EPISODE_ID: 55
MDC_IDC_EPISODE_TYPE: NORMAL
MDC_IDC_EPISODE_TYPE_INDUCED: NO
MDC_IDC_LEAD_CONNECTION_STATUS: NORMAL
MDC_IDC_LEAD_CONNECTION_STATUS: NORMAL
MDC_IDC_LEAD_IMPLANT_DT: NORMAL
MDC_IDC_LEAD_IMPLANT_DT: NORMAL
MDC_IDC_LEAD_LOCATION: NORMAL
MDC_IDC_LEAD_LOCATION: NORMAL
MDC_IDC_LEAD_LOCATION_DETAIL_1: NORMAL
MDC_IDC_LEAD_LOCATION_DETAIL_1: NORMAL
MDC_IDC_LEAD_MFG: NORMAL
MDC_IDC_LEAD_MFG: NORMAL
MDC_IDC_LEAD_MODEL: NORMAL
MDC_IDC_LEAD_MODEL: NORMAL
MDC_IDC_LEAD_POLARITY_TYPE: NORMAL
MDC_IDC_LEAD_POLARITY_TYPE: NORMAL
MDC_IDC_LEAD_SERIAL: NORMAL
MDC_IDC_LEAD_SERIAL: NORMAL
MDC_IDC_LEAD_SPECIAL_FUNCTION: NORMAL
MDC_IDC_LEAD_SPECIAL_FUNCTION: NORMAL
MDC_IDC_MSMT_BATTERY_DTM: NORMAL
MDC_IDC_MSMT_BATTERY_REMAINING_LONGEVITY: 90 MO
MDC_IDC_MSMT_BATTERY_RRT_TRIGGER: 2.62
MDC_IDC_MSMT_BATTERY_STATUS: NORMAL
MDC_IDC_MSMT_BATTERY_VOLTAGE: 2.98 V
MDC_IDC_MSMT_LEADCHNL_RA_IMPEDANCE_VALUE: 304 OHM
MDC_IDC_MSMT_LEADCHNL_RA_IMPEDANCE_VALUE: 456 OHM
MDC_IDC_MSMT_LEADCHNL_RA_PACING_THRESHOLD_AMPLITUDE: 0.5 V
MDC_IDC_MSMT_LEADCHNL_RA_PACING_THRESHOLD_PULSEWIDTH: 0.4 MS
MDC_IDC_MSMT_LEADCHNL_RA_SENSING_INTR_AMPL: 3.38 MV
MDC_IDC_MSMT_LEADCHNL_RA_SENSING_INTR_AMPL: 4.5 MV
MDC_IDC_MSMT_LEADCHNL_RV_IMPEDANCE_VALUE: 342 OHM
MDC_IDC_MSMT_LEADCHNL_RV_IMPEDANCE_VALUE: 456 OHM
MDC_IDC_MSMT_LEADCHNL_RV_PACING_THRESHOLD_AMPLITUDE: 0.62 V
MDC_IDC_MSMT_LEADCHNL_RV_PACING_THRESHOLD_AMPLITUDE: 0.75 V
MDC_IDC_MSMT_LEADCHNL_RV_PACING_THRESHOLD_PULSEWIDTH: 0.4 MS
MDC_IDC_MSMT_LEADCHNL_RV_PACING_THRESHOLD_PULSEWIDTH: 0.4 MS
MDC_IDC_MSMT_LEADCHNL_RV_SENSING_INTR_AMPL: 2.25 MV
MDC_IDC_MSMT_LEADCHNL_RV_SENSING_INTR_AMPL: 2.25 MV
MDC_IDC_PG_IMPLANT_DTM: NORMAL
MDC_IDC_PG_MFG: NORMAL
MDC_IDC_PG_MODEL: NORMAL
MDC_IDC_PG_SERIAL: NORMAL
MDC_IDC_PG_TYPE: NORMAL
MDC_IDC_SESS_CLINIC_NAME: NORMAL
MDC_IDC_SESS_DTM: NORMAL
MDC_IDC_SESS_TYPE: NORMAL
MDC_IDC_SET_BRADY_AT_MODE_SWITCH_RATE: 171 {BEATS}/MIN
MDC_IDC_SET_BRADY_HYSTRATE: NORMAL
MDC_IDC_SET_BRADY_LOWRATE: 60 {BEATS}/MIN
MDC_IDC_SET_BRADY_MAX_SENSOR_RATE: 130 {BEATS}/MIN
MDC_IDC_SET_BRADY_MAX_TRACKING_RATE: 130 {BEATS}/MIN
MDC_IDC_SET_BRADY_MODE: NORMAL
MDC_IDC_SET_BRADY_PAV_DELAY_LOW: 180 MS
MDC_IDC_SET_BRADY_SAV_DELAY_LOW: 150 MS
MDC_IDC_SET_LEADCHNL_RA_PACING_AMPLITUDE: 1.5 V
MDC_IDC_SET_LEADCHNL_RA_PACING_ANODE_ELECTRODE_1: NORMAL
MDC_IDC_SET_LEADCHNL_RA_PACING_ANODE_LOCATION_1: NORMAL
MDC_IDC_SET_LEADCHNL_RA_PACING_CAPTURE_MODE: NORMAL
MDC_IDC_SET_LEADCHNL_RA_PACING_CATHODE_ELECTRODE_1: NORMAL
MDC_IDC_SET_LEADCHNL_RA_PACING_CATHODE_LOCATION_1: NORMAL
MDC_IDC_SET_LEADCHNL_RA_PACING_POLARITY: NORMAL
MDC_IDC_SET_LEADCHNL_RA_PACING_PULSEWIDTH: 0.4 MS
MDC_IDC_SET_LEADCHNL_RA_SENSING_ANODE_ELECTRODE_1: NORMAL
MDC_IDC_SET_LEADCHNL_RA_SENSING_ANODE_LOCATION_1: NORMAL
MDC_IDC_SET_LEADCHNL_RA_SENSING_CATHODE_ELECTRODE_1: NORMAL
MDC_IDC_SET_LEADCHNL_RA_SENSING_CATHODE_LOCATION_1: NORMAL
MDC_IDC_SET_LEADCHNL_RA_SENSING_POLARITY: NORMAL
MDC_IDC_SET_LEADCHNL_RA_SENSING_SENSITIVITY: 0.3 MV
MDC_IDC_SET_LEADCHNL_RV_PACING_AMPLITUDE: 1.5 V
MDC_IDC_SET_LEADCHNL_RV_PACING_ANODE_ELECTRODE_1: NORMAL
MDC_IDC_SET_LEADCHNL_RV_PACING_ANODE_LOCATION_1: NORMAL
MDC_IDC_SET_LEADCHNL_RV_PACING_CAPTURE_MODE: NORMAL
MDC_IDC_SET_LEADCHNL_RV_PACING_CATHODE_ELECTRODE_1: NORMAL
MDC_IDC_SET_LEADCHNL_RV_PACING_CATHODE_LOCATION_1: NORMAL
MDC_IDC_SET_LEADCHNL_RV_PACING_POLARITY: NORMAL
MDC_IDC_SET_LEADCHNL_RV_PACING_PULSEWIDTH: 0.4 MS
MDC_IDC_SET_LEADCHNL_RV_SENSING_ANODE_ELECTRODE_1: NORMAL
MDC_IDC_SET_LEADCHNL_RV_SENSING_ANODE_LOCATION_1: NORMAL
MDC_IDC_SET_LEADCHNL_RV_SENSING_CATHODE_ELECTRODE_1: NORMAL
MDC_IDC_SET_LEADCHNL_RV_SENSING_CATHODE_LOCATION_1: NORMAL
MDC_IDC_SET_LEADCHNL_RV_SENSING_POLARITY: NORMAL
MDC_IDC_SET_LEADCHNL_RV_SENSING_SENSITIVITY: 0.9 MV
MDC_IDC_SET_ZONE_DETECTION_INTERVAL: 200 MS
MDC_IDC_SET_ZONE_DETECTION_INTERVAL: 350 MS
MDC_IDC_SET_ZONE_DETECTION_INTERVAL: 400 MS
MDC_IDC_SET_ZONE_STATUS: NORMAL
MDC_IDC_SET_ZONE_TYPE: NORMAL
MDC_IDC_SET_ZONE_VENDOR_TYPE: NORMAL
MDC_IDC_STAT_AT_BURDEN_PERCENT: 0 %
MDC_IDC_STAT_AT_DTM_END: NORMAL
MDC_IDC_STAT_AT_DTM_START: NORMAL
MDC_IDC_STAT_BRADY_AP_VP_PERCENT: 12.69 %
MDC_IDC_STAT_BRADY_AP_VS_PERCENT: 14.52 %
MDC_IDC_STAT_BRADY_AS_VP_PERCENT: 32.42 %
MDC_IDC_STAT_BRADY_AS_VS_PERCENT: 40.37 %
MDC_IDC_STAT_BRADY_DTM_END: NORMAL
MDC_IDC_STAT_BRADY_DTM_START: NORMAL
MDC_IDC_STAT_BRADY_RA_PERCENT_PACED: 27.17 %
MDC_IDC_STAT_BRADY_RV_PERCENT_PACED: 45.12 %
MDC_IDC_STAT_EPISODE_RECENT_COUNT: 0
MDC_IDC_STAT_EPISODE_RECENT_COUNT: 21
MDC_IDC_STAT_EPISODE_RECENT_COUNT: 3
MDC_IDC_STAT_EPISODE_RECENT_COUNT_DTM_END: NORMAL
MDC_IDC_STAT_EPISODE_RECENT_COUNT_DTM_START: NORMAL
MDC_IDC_STAT_EPISODE_TOTAL_COUNT: 0
MDC_IDC_STAT_EPISODE_TOTAL_COUNT: 0
MDC_IDC_STAT_EPISODE_TOTAL_COUNT: 1
MDC_IDC_STAT_EPISODE_TOTAL_COUNT: 11
MDC_IDC_STAT_EPISODE_TOTAL_COUNT: 43
MDC_IDC_STAT_EPISODE_TOTAL_COUNT_DTM_END: NORMAL
MDC_IDC_STAT_EPISODE_TOTAL_COUNT_DTM_START: NORMAL
MDC_IDC_STAT_EPISODE_TYPE: NORMAL
MDC_IDC_STAT_TACHYTHERAPY_RECENT_DTM_END: NORMAL
MDC_IDC_STAT_TACHYTHERAPY_RECENT_DTM_START: NORMAL
MDC_IDC_STAT_TACHYTHERAPY_TOTAL_DTM_END: NORMAL
MDC_IDC_STAT_TACHYTHERAPY_TOTAL_DTM_START: NORMAL

## 2024-11-06 PROCEDURE — 93280 PM DEVICE PROGR EVAL DUAL: CPT | Performed by: INTERNAL MEDICINE

## 2024-11-08 ENCOUNTER — HOSPITAL ENCOUNTER (OUTPATIENT)
Dept: CARDIOLOGY | Facility: HOSPITAL | Age: 84
Discharge: HOME | End: 2024-11-08
Attending: INTERNAL MEDICINE | Admitting: INTERNAL MEDICINE
Payer: COMMERCIAL

## 2024-11-08 DIAGNOSIS — I48.0 PAROXYSMAL ATRIAL FIBRILLATION (H): ICD-10-CM

## 2024-11-08 DIAGNOSIS — Z95.0 CARDIAC PACEMAKER IN SITU: ICD-10-CM

## 2024-11-08 PROCEDURE — 93306 TTE W/DOPPLER COMPLETE: CPT | Mod: 26 | Performed by: INTERNAL MEDICINE

## 2024-11-08 PROCEDURE — 93306 TTE W/DOPPLER COMPLETE: CPT

## 2025-02-07 ENCOUNTER — ANCILLARY PROCEDURE (OUTPATIENT)
Dept: CARDIOLOGY | Facility: CLINIC | Age: 85
End: 2025-02-07
Attending: INTERNAL MEDICINE
Payer: COMMERCIAL

## 2025-02-07 DIAGNOSIS — Z95.0 CARDIAC PACEMAKER IN SITU: ICD-10-CM

## 2025-02-07 DIAGNOSIS — Z95.810 ICD (IMPLANTABLE CARDIOVERTER-DEFIBRILLATOR) IN PLACE: ICD-10-CM

## 2025-02-12 LAB
MDC_IDC_EPISODE_DTM: NORMAL
MDC_IDC_EPISODE_DURATION: 38 S
MDC_IDC_EPISODE_ID: 56
MDC_IDC_EPISODE_TYPE: NORMAL
MDC_IDC_LEAD_CONNECTION_STATUS: NORMAL
MDC_IDC_LEAD_CONNECTION_STATUS: NORMAL
MDC_IDC_LEAD_IMPLANT_DT: NORMAL
MDC_IDC_LEAD_IMPLANT_DT: NORMAL
MDC_IDC_LEAD_LOCATION: NORMAL
MDC_IDC_LEAD_LOCATION: NORMAL
MDC_IDC_LEAD_LOCATION_DETAIL_1: NORMAL
MDC_IDC_LEAD_LOCATION_DETAIL_1: NORMAL
MDC_IDC_LEAD_MFG: NORMAL
MDC_IDC_LEAD_MFG: NORMAL
MDC_IDC_LEAD_MODEL: NORMAL
MDC_IDC_LEAD_MODEL: NORMAL
MDC_IDC_LEAD_POLARITY_TYPE: NORMAL
MDC_IDC_LEAD_POLARITY_TYPE: NORMAL
MDC_IDC_LEAD_SERIAL: NORMAL
MDC_IDC_LEAD_SERIAL: NORMAL
MDC_IDC_LEAD_SPECIAL_FUNCTION: NORMAL
MDC_IDC_LEAD_SPECIAL_FUNCTION: NORMAL
MDC_IDC_MSMT_BATTERY_DTM: NORMAL
MDC_IDC_MSMT_BATTERY_REMAINING_LONGEVITY: 85 MO
MDC_IDC_MSMT_BATTERY_RRT_TRIGGER: 2.62
MDC_IDC_MSMT_BATTERY_STATUS: NORMAL
MDC_IDC_MSMT_BATTERY_VOLTAGE: 2.98 V
MDC_IDC_MSMT_LEADCHNL_RA_IMPEDANCE_VALUE: 304 OHM
MDC_IDC_MSMT_LEADCHNL_RA_IMPEDANCE_VALUE: 418 OHM
MDC_IDC_MSMT_LEADCHNL_RA_PACING_THRESHOLD_AMPLITUDE: 0.5 V
MDC_IDC_MSMT_LEADCHNL_RA_PACING_THRESHOLD_PULSEWIDTH: 0.4 MS
MDC_IDC_MSMT_LEADCHNL_RA_SENSING_INTR_AMPL: 3.38 MV
MDC_IDC_MSMT_LEADCHNL_RA_SENSING_INTR_AMPL: 3.38 MV
MDC_IDC_MSMT_LEADCHNL_RV_IMPEDANCE_VALUE: 323 OHM
MDC_IDC_MSMT_LEADCHNL_RV_IMPEDANCE_VALUE: 399 OHM
MDC_IDC_MSMT_LEADCHNL_RV_PACING_THRESHOLD_AMPLITUDE: 0.88 V
MDC_IDC_MSMT_LEADCHNL_RV_PACING_THRESHOLD_PULSEWIDTH: 0.4 MS
MDC_IDC_MSMT_LEADCHNL_RV_SENSING_INTR_AMPL: 2.12 MV
MDC_IDC_MSMT_LEADCHNL_RV_SENSING_INTR_AMPL: 2.12 MV
MDC_IDC_PG_IMPLANT_DTM: NORMAL
MDC_IDC_PG_MFG: NORMAL
MDC_IDC_PG_MODEL: NORMAL
MDC_IDC_PG_SERIAL: NORMAL
MDC_IDC_PG_TYPE: NORMAL
MDC_IDC_SESS_CLINIC_NAME: NORMAL
MDC_IDC_SESS_DTM: NORMAL
MDC_IDC_SESS_TYPE: NORMAL
MDC_IDC_SET_BRADY_AT_MODE_SWITCH_RATE: 171 {BEATS}/MIN
MDC_IDC_SET_BRADY_HYSTRATE: NORMAL
MDC_IDC_SET_BRADY_LOWRATE: 60 {BEATS}/MIN
MDC_IDC_SET_BRADY_MAX_SENSOR_RATE: 130 {BEATS}/MIN
MDC_IDC_SET_BRADY_MAX_TRACKING_RATE: 130 {BEATS}/MIN
MDC_IDC_SET_BRADY_MODE: NORMAL
MDC_IDC_SET_BRADY_PAV_DELAY_LOW: 180 MS
MDC_IDC_SET_BRADY_SAV_DELAY_LOW: 150 MS
MDC_IDC_SET_LEADCHNL_RA_PACING_AMPLITUDE: 1.5 V
MDC_IDC_SET_LEADCHNL_RA_PACING_ANODE_ELECTRODE_1: NORMAL
MDC_IDC_SET_LEADCHNL_RA_PACING_ANODE_LOCATION_1: NORMAL
MDC_IDC_SET_LEADCHNL_RA_PACING_CAPTURE_MODE: NORMAL
MDC_IDC_SET_LEADCHNL_RA_PACING_CATHODE_ELECTRODE_1: NORMAL
MDC_IDC_SET_LEADCHNL_RA_PACING_CATHODE_LOCATION_1: NORMAL
MDC_IDC_SET_LEADCHNL_RA_PACING_POLARITY: NORMAL
MDC_IDC_SET_LEADCHNL_RA_PACING_PULSEWIDTH: 0.4 MS
MDC_IDC_SET_LEADCHNL_RA_SENSING_ANODE_ELECTRODE_1: NORMAL
MDC_IDC_SET_LEADCHNL_RA_SENSING_ANODE_LOCATION_1: NORMAL
MDC_IDC_SET_LEADCHNL_RA_SENSING_CATHODE_ELECTRODE_1: NORMAL
MDC_IDC_SET_LEADCHNL_RA_SENSING_CATHODE_LOCATION_1: NORMAL
MDC_IDC_SET_LEADCHNL_RA_SENSING_POLARITY: NORMAL
MDC_IDC_SET_LEADCHNL_RA_SENSING_SENSITIVITY: 0.3 MV
MDC_IDC_SET_LEADCHNL_RV_PACING_AMPLITUDE: 1.5 V
MDC_IDC_SET_LEADCHNL_RV_PACING_ANODE_ELECTRODE_1: NORMAL
MDC_IDC_SET_LEADCHNL_RV_PACING_ANODE_LOCATION_1: NORMAL
MDC_IDC_SET_LEADCHNL_RV_PACING_CAPTURE_MODE: NORMAL
MDC_IDC_SET_LEADCHNL_RV_PACING_CATHODE_ELECTRODE_1: NORMAL
MDC_IDC_SET_LEADCHNL_RV_PACING_CATHODE_LOCATION_1: NORMAL
MDC_IDC_SET_LEADCHNL_RV_PACING_POLARITY: NORMAL
MDC_IDC_SET_LEADCHNL_RV_PACING_PULSEWIDTH: 0.4 MS
MDC_IDC_SET_LEADCHNL_RV_SENSING_ANODE_ELECTRODE_1: NORMAL
MDC_IDC_SET_LEADCHNL_RV_SENSING_ANODE_LOCATION_1: NORMAL
MDC_IDC_SET_LEADCHNL_RV_SENSING_CATHODE_ELECTRODE_1: NORMAL
MDC_IDC_SET_LEADCHNL_RV_SENSING_CATHODE_LOCATION_1: NORMAL
MDC_IDC_SET_LEADCHNL_RV_SENSING_POLARITY: NORMAL
MDC_IDC_SET_LEADCHNL_RV_SENSING_SENSITIVITY: 0.9 MV
MDC_IDC_SET_ZONE_DETECTION_INTERVAL: 200 MS
MDC_IDC_SET_ZONE_DETECTION_INTERVAL: 350 MS
MDC_IDC_SET_ZONE_DETECTION_INTERVAL: 400 MS
MDC_IDC_SET_ZONE_STATUS: NORMAL
MDC_IDC_SET_ZONE_TYPE: NORMAL
MDC_IDC_SET_ZONE_VENDOR_TYPE: NORMAL
MDC_IDC_STAT_AT_BURDEN_PERCENT: 0 %
MDC_IDC_STAT_AT_DTM_END: NORMAL
MDC_IDC_STAT_AT_DTM_START: NORMAL
MDC_IDC_STAT_BRADY_AP_VP_PERCENT: 9.35 %
MDC_IDC_STAT_BRADY_AP_VS_PERCENT: 6.91 %
MDC_IDC_STAT_BRADY_AS_VP_PERCENT: 59.48 %
MDC_IDC_STAT_BRADY_AS_VS_PERCENT: 24.26 %
MDC_IDC_STAT_BRADY_DTM_END: NORMAL
MDC_IDC_STAT_BRADY_DTM_START: NORMAL
MDC_IDC_STAT_BRADY_RA_PERCENT_PACED: 16.12 %
MDC_IDC_STAT_BRADY_RV_PERCENT_PACED: 68.84 %
MDC_IDC_STAT_EPISODE_RECENT_COUNT: 0
MDC_IDC_STAT_EPISODE_RECENT_COUNT: 1
MDC_IDC_STAT_EPISODE_RECENT_COUNT_DTM_END: NORMAL
MDC_IDC_STAT_EPISODE_RECENT_COUNT_DTM_START: NORMAL
MDC_IDC_STAT_EPISODE_TOTAL_COUNT: 0
MDC_IDC_STAT_EPISODE_TOTAL_COUNT: 0
MDC_IDC_STAT_EPISODE_TOTAL_COUNT: 1
MDC_IDC_STAT_EPISODE_TOTAL_COUNT: 11
MDC_IDC_STAT_EPISODE_TOTAL_COUNT: 44
MDC_IDC_STAT_EPISODE_TOTAL_COUNT_DTM_END: NORMAL
MDC_IDC_STAT_EPISODE_TOTAL_COUNT_DTM_START: NORMAL
MDC_IDC_STAT_EPISODE_TYPE: NORMAL
MDC_IDC_STAT_TACHYTHERAPY_RECENT_DTM_END: NORMAL
MDC_IDC_STAT_TACHYTHERAPY_RECENT_DTM_START: NORMAL
MDC_IDC_STAT_TACHYTHERAPY_TOTAL_DTM_END: NORMAL
MDC_IDC_STAT_TACHYTHERAPY_TOTAL_DTM_START: NORMAL

## 2025-02-12 PROCEDURE — 93296 REM INTERROG EVL PM/IDS: CPT | Performed by: INTERNAL MEDICINE

## 2025-02-12 PROCEDURE — 93294 REM INTERROG EVL PM/LDLS PM: CPT | Performed by: INTERNAL MEDICINE

## 2025-02-20 ENCOUNTER — LAB REQUISITION (OUTPATIENT)
Dept: LAB | Facility: CLINIC | Age: 85
End: 2025-02-20
Payer: COMMERCIAL

## 2025-02-20 ENCOUNTER — TRANSFERRED RECORDS (OUTPATIENT)
Dept: HEALTH INFORMATION MANAGEMENT | Facility: CLINIC | Age: 85
End: 2025-02-20

## 2025-02-20 DIAGNOSIS — I48.0 PAROXYSMAL ATRIAL FIBRILLATION (H): ICD-10-CM

## 2025-02-20 DIAGNOSIS — E78.5 HYPERLIPIDEMIA, UNSPECIFIED: ICD-10-CM

## 2025-02-20 LAB
ALBUMIN SERPL BCG-MCNC: 4.2 G/DL (ref 3.5–5.2)
ALP SERPL-CCNC: 65 U/L (ref 40–150)
ALT SERPL W P-5'-P-CCNC: 16 U/L (ref 0–50)
ANION GAP SERPL CALCULATED.3IONS-SCNC: 14 MMOL/L (ref 7–15)
AST SERPL W P-5'-P-CCNC: 34 U/L (ref 0–45)
BILIRUB SERPL-MCNC: 0.4 MG/DL
BUN SERPL-MCNC: 15.4 MG/DL (ref 8–23)
CALCIUM SERPL-MCNC: 10.4 MG/DL (ref 8.8–10.4)
CHLORIDE SERPL-SCNC: 100 MMOL/L (ref 98–107)
CHOLEST SERPL-MCNC: 144 MG/DL
CREAT SERPL-MCNC: 0.71 MG/DL (ref 0.51–0.95)
EGFRCR SERPLBLD CKD-EPI 2021: 84 ML/MIN/1.73M2
FASTING STATUS PATIENT QL REPORTED: NORMAL
FASTING STATUS PATIENT QL REPORTED: NORMAL
GLUCOSE SERPL-MCNC: 82 MG/DL (ref 70–99)
HCO3 SERPL-SCNC: 25 MMOL/L (ref 22–29)
HDLC SERPL-MCNC: 80 MG/DL
LDLC SERPL CALC-MCNC: 47 MG/DL
NONHDLC SERPL-MCNC: 64 MG/DL
POTASSIUM SERPL-SCNC: 4.5 MMOL/L (ref 3.4–5.3)
PROT SERPL-MCNC: 7.5 G/DL (ref 6.4–8.3)
SODIUM SERPL-SCNC: 139 MMOL/L (ref 135–145)
TRIGL SERPL-MCNC: 83 MG/DL

## 2025-02-20 PROCEDURE — 80061 LIPID PANEL: CPT | Mod: ORL | Performed by: FAMILY MEDICINE

## 2025-02-20 PROCEDURE — 80053 COMPREHEN METABOLIC PANEL: CPT | Mod: ORL | Performed by: FAMILY MEDICINE

## 2025-02-24 ENCOUNTER — TELEPHONE (OUTPATIENT)
Dept: CARDIOLOGY | Facility: CLINIC | Age: 85
End: 2025-02-24
Payer: COMMERCIAL

## 2025-02-24 DIAGNOSIS — I48.0 PAROXYSMAL ATRIAL FIBRILLATION (H): Primary | ICD-10-CM

## 2025-02-24 NOTE — TELEPHONE ENCOUNTER
Spoke to pt - willing to see Kae or Lauryn for consult.  Message sent to Dr Banerjee to addend office visit note to include SDM.    ----- Message from Jaylen SAHNI sent at 2/24/2025 11:56 AM CST -----  [facility]  Patient called device line stating she would like to follow Dr. Banerjee's recommendation for Watchman placement.     She can be reached at [phone].    Jaylen Mcintosh RN on 2/24/2025 at 11:59 AM

## 2025-03-03 ENCOUNTER — OFFICE VISIT (OUTPATIENT)
Dept: CARDIOLOGY | Facility: CLINIC | Age: 85
End: 2025-03-03
Payer: COMMERCIAL

## 2025-03-03 VITALS
HEART RATE: 82 BPM | DIASTOLIC BLOOD PRESSURE: 79 MMHG | WEIGHT: 123 LBS | BODY MASS INDEX: 21.45 KG/M2 | RESPIRATION RATE: 14 BRPM | SYSTOLIC BLOOD PRESSURE: 122 MMHG

## 2025-03-03 DIAGNOSIS — Z95.0 CARDIAC PACEMAKER IN SITU: ICD-10-CM

## 2025-03-03 DIAGNOSIS — I44.1 MOBITZ TYPE 2 SECOND DEGREE ATRIOVENTRICULAR BLOCK: Primary | ICD-10-CM

## 2025-03-03 DIAGNOSIS — E78.2 MIXED HYPERLIPIDEMIA: ICD-10-CM

## 2025-03-03 DIAGNOSIS — I48.0 PAROXYSMAL ATRIAL FIBRILLATION (H): ICD-10-CM

## 2025-03-03 PROCEDURE — 99214 OFFICE O/P EST MOD 30 MIN: CPT | Performed by: INTERNAL MEDICINE

## 2025-03-03 PROCEDURE — 3074F SYST BP LT 130 MM HG: CPT | Performed by: INTERNAL MEDICINE

## 2025-03-03 PROCEDURE — 3078F DIAST BP <80 MM HG: CPT | Performed by: INTERNAL MEDICINE

## 2025-03-03 RX ORDER — MONTELUKAST SODIUM 10 MG/1
1 TABLET ORAL DAILY
COMMUNITY

## 2025-03-03 NOTE — LETTER
3/3/2025    Sarah Boyer MD  [address]  [facility] [address]    RE: Alycia Duvall       Dear Colleague,     I had the pleasure of seeing Alycia Duvall in the [facility].  HEART CARE ENCOUNTER NOTE       [facility]  [phone]      Assessment/Recommendations   1.  Paroxysmal atrial fibrillation - diagnosed in 2018.      I have personally reviewed this patient's chart and have spoken with the patient about the treatment options, including MCKENNA device.  She has a [id number] score of 3 for age > 75, female gender.  She has a HAS-BLED score of 2 for age and bleeding disposition.   She is not a good candidate for long-term anticoagulation due to easy bruising and risk of injury being the primary caregiver for her .      Once scheduled, the patient will stay on Eliquis up until implant.  2 weeks prior to implant, patient will start taking aspirin 81 mg daily along with their Eliquis.  She will stay on these 2 agents for 6 weeks after implant. After 6 weeks, she will stop the Eliquis and start plavix 75 mg daily.  She will continue DAPT for the remainder of 6 months, at which time she will stop the Plavix and continue long term ASA therapy.  3-months post-implant, she will have either a CTA or MEETA to evaluate the device for leaks and/or DRT.  She understands that the risks of the procedure are <2% and include, but are not limited to device embolization, air embolism, myocardial perforation, device thrombosis, ASD, stroke, or death.  We discussed expected recovery and follow-up.       The patient is a good candidate for proceeding with left atrial appendage implant.  Her questions were answered to her satisfaction.  If she wishes to proceed, would get CTA prior to see whether her anatomy is better suited for the Watchman or Amulet device    2. Second degree AVB, mobitz II - s/p PPM in 2018.  Normal recent device check    3. Hyperlipidemia - managed on  atorvastatin.        History of Present Illness/Subjective    Alycia Duvall is a 84 year old female who comes in today for discussion regarding her interest in the left atrial appendage closure device    Alycia Duvall has a past history of PAF, second degree AVB, PPM in situ and hyperlipidemia    She had pacemaker placed in 2018 and it started picking up short episodes of atrial fibrillation.  She was started on Eliquis.  She mostly has several minute episodes, but recently had an episode that was over an hour.  She is asymptomatic and never feels arrhythmia.  She is otherwise very healthy.  She is the primary caregiver for her     Overall she has no major bleeding issues but does bruise easily even when she was not on the Eliquis.  She denies history of PE or DVT.  She has never had a stroke    Alycia Duvall denies chest discomfort, palpitations, shortness of breath, paroxysmal nocturnal dyspnea, orthopnea, lightheadedness, dizziness, pre-syncope, or syncope.  Alycia Duvall also denies any weight loss, changes in appetite, nausea or vomiting.     Medical, surgical, family, social history, and medications were reviewed and updated as necessary.    ECHO results (from November 8, 2024):  Interpretation Summary     1. Normal left ventricular size and systolic performance with a visually  estimated ejection fraction of 55-60%.  2. There is abnormal septal motion consistent with ventricular paced rhythm.  3. There is mild aortic insufficiency.  4. There is mild, to perhaps mild-moderate, tricuspid insufficiency.  5. Normal right ventricular size and systolic performance.      Cardiac Device Check 2/7/25:  Encounter Type: routine remote pacemaker transmission.   Device: Medtronic Golden Triangle.   Pacing % /Programmed: AP 16%,  68% at AAIR<=>DDDR 60/130 ppm.   Lead(s): stable.   Battery longevity: 7yrs, 1mo estimated.   Presenting: Sinus 80-85 bpm.   Atrial high rates: since 11/1/24; one mode switch episode <1min,  appears to be atrial tachy arrhythmia.   Anticoagulant: apixaban.   Ventricular High rates: since 11/1/24; none detected.   Comments: Normal device function.   Plan: next remote check scheduled for 5/21/25. NADYA Machado, Device Specialist       Physical Examination Review of Systems   Vitals: /79 (BP Location: Right arm, Patient Position: Sitting, Cuff Size: Adult Regular)   Pulse 82   Resp 14   Wt 55.8 kg (123 lb)   BMI 21.45 kg/m    BMI= Body mass index is 21.45 kg/m .  Wt Readings from Last 3 Encounters:   03/03/25 55.8 kg (123 lb)   11/01/24 55.8 kg (123 lb)   10/25/23 58.1 kg (128 lb)       General Appearance:   Alert, cooperative and in no acute distress   ENT/Mouth: membranes moist, no oral lesions or bleeding gums.      EYES:  no scleral icterus, normal conjunctivae   Neck: Thyroid not visualized   Chest/Lungs:   lungs are clear to auscultation, no rales or wheezing   Cardiovascular:   Regular . Normal first and second heart sounds with no murmurs, rubs or gallops; the carotid, radial and posterior tibial pulses are intact, no edema bilaterally    Abdomen:  Soft and nontender. Bowel sounds are present in all quadrants   Extremities: no cyanosis or clubbing   Skin: no xanthelasma, warm.    Neurologic: normal gait, normal  bilateral, no tremors   Psychiatric: Normal mood and affect       Please refer above for cardiac ROS details.      Medical History  Surgical History Family History Social History   History reviewed. No pertinent past medical history.  Past Surgical History:   Procedure Laterality Date     BACK SURGERY       CHOLECYSTECTOMY       HYSTERECTOMY  at age 43     Family History   Problem Relation Age of Onset     Breast Cancer Sister      Endometrial Cancer Daughter      Cerebrovascular Disease Mother      CABG Father     Social History     Socioeconomic History     Marital status:      Spouse name: Not on file     Number of children: Not on file     Years of education: Not on file      Highest education level: Not on file   Occupational History     Not on file   Tobacco Use     Smoking status: Former     Current packs/day: 0.00     Types: Cigarettes     Quit date: 1984     Years since quittin.1     Smokeless tobacco: Never   Substance and Sexual Activity     Alcohol use: Not on file     Drug use: Not on file     Sexual activity: Not on file   Other Topics Concern     Not on file   Social History Narrative     Not on file     Social Drivers of Health     Financial Resource Strain: Not on file   Food Insecurity: Not on file   Transportation Needs: Not on file   Physical Activity: Not on file   Stress: Not on file   Social Connections: Not on file   Interpersonal Safety: Not on file   Housing Stability: Not on file          Medications  Allergies   Current Outpatient Medications   Medication Sig Dispense Refill     acetaminophen (TYLENOL) 325 MG tablet [ACETAMINOPHEN (TYLENOL) 325 MG TABLET] Take 650 mg by mouth as needed.       calcium carbonate-vitamin D3 (OS-TOD) 500-100 mg-unit chewable tablet [CALCIUM CARBONATE-VITAMIN D3 (OS-TOD) 500-100 MG-UNIT CHEWABLE TABLET] Chew 500 mg daily.       cholecalciferol, vitamin D3, 5,000 unit Tab [CHOLECALCIFEROL, VITAMIN D3, 5,000 UNIT TAB] Take 5,000 Units by mouth.       ELIQUIS 5 mg Tab tablet [ELIQUIS 5 MG TAB TABLET] Take 1 tablet (5 mg total) by mouth 2 (two) times a day. 180 tablet 2     estradiol (ESTRACE) 0.5 MG tablet [ESTRADIOL (ESTRACE) 0.5 MG TABLET] Take 1 mg by mouth daily.       montelukast (SINGULAIR) 10 MG tablet Take 1 tablet by mouth daily.       omega 3-dha-epa-fish oil (FISH OIL) 60- mg cap capsule [OMEGA 3-DHA-EPA-FISH OIL (FISH OIL) 60- MG CAP CAPSULE] Take 1,000 mg by mouth daily.       zolpidem (AMBIEN) 10 MG tablet take 1 tablet by mouth once daily at bedtime as needed       atorvastatin (LIPITOR) 10 MG tablet [ATORVASTATIN (LIPITOR) 10 MG TABLET] Take 10 mg by mouth daily.      Allergies   Allergen Reactions  "    Erythromycin Other (See Comments)     Chewable , Thrush in mouth         Lab Results    Chemistry/lipid CBC Cardiac Enzymes/BNP/TSH/INR   Recent Labs   Lab Test 02/20/25  1134   CHOL 144   HDL 80   LDL 47   TRIG 83     Recent Labs   Lab Test 02/20/25  1134 02/08/24  1207 06/10/19  1424   LDL 47 52 63     Recent Labs   Lab Test 02/20/25  1134      POTASSIUM 4.5   CHLORIDE 100   CO2 25   GLC 82   BUN 15.4   CR 0.71   GFRESTIMATED 84   TOD 10.4     Recent Labs   Lab Test 02/20/25  1134 02/08/24  1207 06/10/19  1424   CR 0.71 0.73 0.78     No results for input(s): \"A1C\" in the last 80135 hours. No results for input(s): \"WBC\", \"HGB\", \"HCT\", \"MCV\", \"PLT\" in the last 64737 hours.  No results for input(s): \"HGB\" in the last 28541 hours. No results for input(s): \"TROPONINI\" in the last 42363 hours.  No results for input(s): \"BNP\", \"NTBNPI\", \"NTBNP\" in the last 01290 hours.  No results for input(s): \"TSH\" in the last 32090 hours.  No results for input(s): \"INR\" in the last 81595 hours.       This note has been dictated using voice recognition software. Any grammatical or context distortions are unintentional and inherent to the software.          Thank you for allowing me to participate in the care of your patient.      Sincerely,     ALYCE CANTU PA-C     [facility]  cc:   Trinity Banerjee MD  [facility]  [place] [address]      "

## 2025-03-03 NOTE — PROGRESS NOTES
HEART CARE ENCOUNTER NOTE       [facility]  [phone]      Assessment/Recommendations   1.  Paroxysmal atrial fibrillation - diagnosed in 2018.      I have personally reviewed this patient's chart and have spoken with the patient about the treatment options, including MCKENNA device.  She has a [id number] score of 3 for age > 75, female gender.  She has a HAS-BLED score of 2 for age and bleeding disposition.   She is not a good candidate for long-term anticoagulation due to easy bruising and risk of injury being the primary caregiver for her .      Once scheduled, the patient will stay on Eliquis up until implant.  2 weeks prior to implant, patient will start taking aspirin 81 mg daily along with their Eliquis.  She will stay on these 2 agents for 6 weeks after implant. After 6 weeks, she will stop the Eliquis and start plavix 75 mg daily.  She will continue DAPT for the remainder of 6 months, at which time she will stop the Plavix and continue long term ASA therapy.  3-months post-implant, she will have either a CTA or MEETA to evaluate the device for leaks and/or DRT.  She understands that the risks of the procedure are <2% and include, but are not limited to device embolization, air embolism, myocardial perforation, device thrombosis, ASD, stroke, or death.  We discussed expected recovery and follow-up.       The patient is a good candidate for proceeding with left atrial appendage implant.  Her questions were answered to her satisfaction.  If she wishes to proceed, would get CTA prior to see whether her anatomy is better suited for the Watchman or Amulet device    2. Second degree AVB, mobitz II - s/p PPM in 2018.  Normal recent device check    3. Hyperlipidemia - managed on atorvastatin.        History of Present Illness/Subjective    Alycia YAIMA Eloina is a 84 year old female who comes in today for discussion regarding her interest in the left atrial appendage closure device    Alycia ERWIN  Eloina has a past history of PAF, second degree AVB, PPM in situ and hyperlipidemia    She had pacemaker placed in 2018 and it started picking up short episodes of atrial fibrillation.  She was started on Eliquis.  She mostly has several minute episodes, but recently had an episode that was over an hour.  She is asymptomatic and never feels arrhythmia.  She is otherwise very healthy.  She is the primary caregiver for her     Overall she has no major bleeding issues but does bruise easily even when she was not on the Eliquis.  She denies history of PE or DVT.  She has never had a stroke    Alycia Duvall denies chest discomfort, palpitations, shortness of breath, paroxysmal nocturnal dyspnea, orthopnea, lightheadedness, dizziness, pre-syncope, or syncope.  Alycia Duvall also denies any weight loss, changes in appetite, nausea or vomiting.     Medical, surgical, family, social history, and medications were reviewed and updated as necessary.    ECHO results (from November 8, 2024):  Interpretation Summary     1. Normal left ventricular size and systolic performance with a visually  estimated ejection fraction of 55-60%.  2. There is abnormal septal motion consistent with ventricular paced rhythm.  3. There is mild aortic insufficiency.  4. There is mild, to perhaps mild-moderate, tricuspid insufficiency.  5. Normal right ventricular size and systolic performance.      Cardiac Device Check 2/7/25:  Encounter Type: routine remote pacemaker transmission.   Device: Medtronic Black [place].   Pacing % /Programmed: AP 16%,  68% at AAIR<=>DDDR 60/130 ppm.   Lead(s): stable.   Battery longevity: 7yrs, 1mo estimated.   Presenting: Sinus 80-85 bpm.   Atrial high rates: since 11/1/24; one mode switch episode <1min, appears to be atrial tachy arrhythmia.   Anticoagulant: apixaban.   Ventricular High rates: since 11/1/24; none detected.   Comments: Normal device function.   Plan: next remote check scheduled for 5/21/25. NADYA  Pivec, Device Specialist       Physical Examination Review of Systems   Vitals: /79 (BP Location: Right arm, Patient Position: Sitting, Cuff Size: Adult Regular)   Pulse 82   Resp 14   Wt 55.8 kg (123 lb)   BMI 21.45 kg/m    BMI= Body mass index is 21.45 kg/m .  Wt Readings from Last 3 Encounters:   03/03/25 55.8 kg (123 lb)   11/01/24 55.8 kg (123 lb)   10/25/23 58.1 kg (128 lb)       General Appearance:   Alert, cooperative and in no acute distress   ENT/Mouth: membranes moist, no oral lesions or bleeding gums.      EYES:  no scleral icterus, normal conjunctivae   Neck: Thyroid not visualized   Chest/Lungs:   lungs are clear to auscultation, no rales or wheezing   Cardiovascular:   Regular . Normal first and second heart sounds with no murmurs, rubs or gallops; the carotid, radial and posterior tibial pulses are intact, no edema bilaterally    Abdomen:  Soft and nontender. Bowel sounds are present in all quadrants   Extremities: no cyanosis or clubbing   Skin: no xanthelasma, warm.    Neurologic: normal gait, normal  bilateral, no tremors   Psychiatric: Normal mood and affect       Please refer above for cardiac ROS details.      Medical History  Surgical History Family History Social History   History reviewed. No pertinent past medical history.  Past Surgical History:   Procedure Laterality Date    BACK SURGERY      CHOLECYSTECTOMY      HYSTERECTOMY  at age 43     Family History   Problem Relation Age of Onset    Breast Cancer Sister     Endometrial Cancer Daughter     Cerebrovascular Disease Mother     CABG Father     Social History     Socioeconomic History    Marital status:      Spouse name: Not on file    Number of children: Not on file    Years of education: Not on file    Highest education level: Not on file   Occupational History    Not on file   Tobacco Use    Smoking status: Former     Current packs/day: 0.00     Types: Cigarettes     Quit date: 1/1/1984     Years since quitting:  41.1    Smokeless tobacco: Never   Substance and Sexual Activity    Alcohol use: Not on file    Drug use: Not on file    Sexual activity: Not on file   Other Topics Concern    Not on file   Social History Narrative    Not on file     Social Drivers of Health     Financial Resource Strain: Not on file   Food Insecurity: Not on file   Transportation Needs: Not on file   Physical Activity: Not on file   Stress: Not on file   Social Connections: Not on file   Interpersonal Safety: Not on file   Housing Stability: Not on file          Medications  Allergies   Current Outpatient Medications   Medication Sig Dispense Refill    acetaminophen (TYLENOL) 325 MG tablet [ACETAMINOPHEN (TYLENOL) 325 MG TABLET] Take 650 mg by mouth as needed.      calcium carbonate-vitamin D3 (OS-TOD) 500-100 mg-unit chewable tablet [CALCIUM CARBONATE-VITAMIN D3 (OS-TOD) 500-100 MG-UNIT CHEWABLE TABLET] Chew 500 mg daily.      cholecalciferol, vitamin D3, 5,000 unit Tab [CHOLECALCIFEROL, VITAMIN D3, 5,000 UNIT TAB] Take 5,000 Units by mouth.      ELIQUIS 5 mg Tab tablet [ELIQUIS 5 MG TAB TABLET] Take 1 tablet (5 mg total) by mouth 2 (two) times a day. 180 tablet 2    estradiol (ESTRACE) 0.5 MG tablet [ESTRADIOL (ESTRACE) 0.5 MG TABLET] Take 1 mg by mouth daily.      montelukast (SINGULAIR) 10 MG tablet Take 1 tablet by mouth daily.      omega 3-dha-epa-fish oil (FISH OIL) 60- mg cap capsule [OMEGA 3-DHA-EPA-FISH OIL (FISH OIL) 60- MG CAP CAPSULE] Take 1,000 mg by mouth daily.      zolpidem (AMBIEN) 10 MG tablet take 1 tablet by mouth once daily at bedtime as needed      atorvastatin (LIPITOR) 10 MG tablet [ATORVASTATIN (LIPITOR) 10 MG TABLET] Take 10 mg by mouth daily.      Allergies   Allergen Reactions    Erythromycin Other (See Comments)     Chewable , Thrush in mouth         Lab Results    Chemistry/lipid CBC Cardiac Enzymes/BNP/TSH/INR   Recent Labs   Lab Test 02/20/25  1134   CHOL 144   HDL 80   LDL 47   TRIG 83     Recent Labs  "  Lab Test 02/20/25  1134 02/08/24  1207 06/10/19  1424   LDL 47 52 63     Recent Labs   Lab Test 02/20/25  1134      POTASSIUM 4.5   CHLORIDE 100   CO2 25   GLC 82   BUN 15.4   CR 0.71   GFRESTIMATED 84   TOD 10.4     Recent Labs   Lab Test 02/20/25  1134 02/08/24  1207 06/10/19  1424   CR 0.71 0.73 0.78     No results for input(s): \"A1C\" in the last 25720 hours. No results for input(s): \"WBC\", \"HGB\", \"HCT\", \"MCV\", \"PLT\" in the last 75953 hours.  No results for input(s): \"HGB\" in the last 85353 hours. No results for input(s): \"TROPONINI\" in the last 79765 hours.  No results for input(s): \"BNP\", \"NTBNPI\", \"NTBNP\" in the last 73486 hours.  No results for input(s): \"TSH\" in the last 28010 hours.  No results for input(s): \"INR\" in the last 30333 hours.       This note has been dictated using voice recognition software. Any grammatical or context distortions are unintentional and inherent to the software.        "

## 2025-03-03 NOTE — PATIENT INSTRUCTIONS
Alycia Duvall,    It was a pleasure to see you today in the clinic regarding your interest in the left atrial appendage occlusion device.     Today we discussed left atrial appendage closure with the Watchman or Amulet device. The goal of receiving a device like this is to protect you from stroke caused by atrial fibrillation without long-term use of a blood thinner.       If you receive the Watchman device, you will stay on your Eliquis along with aspirin 81mg daily for 6 weeks.  Then you will see someone from our team in follow up and we will stop the Eliquis.  You will continue taking your ASA 81 mg daily and will start taking Plavix 75mg daily.  You will continue this regimen for the remainder of 6 months. After 6 months, you will stop Plavix and continue aspirin 81mg daily indefinitely.     If you received the Amulet, you would stop Eliquis the day of the procedure and you would be on aspirin and Plavix for 6 months, at which time you would stay on ASA 81 mg daily for the rest of your life.     With either device, you will have a transesophageal echocardiogram or CT approximately 3 months after your procedure to confirm the device is functioning correctly.      Please do not hesitate to call our office with further questions or concerns.        If you have questions or concerns, please call using the numbers below:    MCKENNA (Watchman/Amulet) clinic phone   [phone]     After Hours/Scheduling  [phone]    Otherwise you can dial the nurse directly at:    Serena Larose RN  [phone]

## 2025-03-11 ENCOUNTER — TELEPHONE (OUTPATIENT)
Dept: CARDIOLOGY | Facility: CLINIC | Age: 85
End: 2025-03-11
Payer: COMMERCIAL

## 2025-03-11 NOTE — TELEPHONE ENCOUNTER
Follow-up call placed to pt.  Made sure pt had our phone # to call when interested - she is, just the timing is not right at the moment.  Pt would prefer CTA prior to define anatomy to see which would fit better - Watchman or Amulet.  Pt had no questions and will call when ready to move forward.  -ral    ----- Message -----  From: Kae Zepeda PA-C  Sent: 3/3/2025   8:24 AM CDT  To: [facility] Left Atrial Appendage Closure Pool - e    She's going to think about it, but probably won't move forward right now - maybe later.     If she decides yes, then let's have her get a CTA to see which device is better for her anatomy.  Kidney function normal.      Thanks,  Kae    
yes

## 2025-05-21 ENCOUNTER — ANCILLARY PROCEDURE (OUTPATIENT)
Dept: CARDIOLOGY | Facility: CLINIC | Age: 85
End: 2025-05-21
Attending: INTERNAL MEDICINE
Payer: COMMERCIAL

## 2025-05-21 DIAGNOSIS — Z95.0 CARDIAC PACEMAKER IN SITU: ICD-10-CM

## 2025-05-21 DIAGNOSIS — Z95.810 ICD (IMPLANTABLE CARDIOVERTER-DEFIBRILLATOR) IN PLACE: ICD-10-CM

## 2025-05-21 LAB
MDC_IDC_LEAD_CONNECTION_STATUS: NORMAL
MDC_IDC_LEAD_CONNECTION_STATUS: NORMAL
MDC_IDC_LEAD_IMPLANT_DT: NORMAL
MDC_IDC_LEAD_IMPLANT_DT: NORMAL
MDC_IDC_LEAD_LOCATION: NORMAL
MDC_IDC_LEAD_LOCATION: NORMAL
MDC_IDC_LEAD_LOCATION_DETAIL_1: NORMAL
MDC_IDC_LEAD_LOCATION_DETAIL_1: NORMAL
MDC_IDC_LEAD_MFG: NORMAL
MDC_IDC_LEAD_MFG: NORMAL
MDC_IDC_LEAD_MODEL: NORMAL
MDC_IDC_LEAD_MODEL: NORMAL
MDC_IDC_LEAD_POLARITY_TYPE: NORMAL
MDC_IDC_LEAD_POLARITY_TYPE: NORMAL
MDC_IDC_LEAD_SERIAL: NORMAL
MDC_IDC_LEAD_SERIAL: NORMAL
MDC_IDC_LEAD_SPECIAL_FUNCTION: NORMAL
MDC_IDC_LEAD_SPECIAL_FUNCTION: NORMAL
MDC_IDC_MSMT_BATTERY_DTM: NORMAL
MDC_IDC_MSMT_BATTERY_REMAINING_LONGEVITY: 86 MO
MDC_IDC_MSMT_BATTERY_RRT_TRIGGER: 2.62
MDC_IDC_MSMT_BATTERY_STATUS: NORMAL
MDC_IDC_MSMT_BATTERY_VOLTAGE: 2.98 V
MDC_IDC_MSMT_LEADCHNL_RA_IMPEDANCE_VALUE: 304 OHM
MDC_IDC_MSMT_LEADCHNL_RA_IMPEDANCE_VALUE: 399 OHM
MDC_IDC_MSMT_LEADCHNL_RA_PACING_THRESHOLD_AMPLITUDE: 0.5 V
MDC_IDC_MSMT_LEADCHNL_RA_PACING_THRESHOLD_PULSEWIDTH: 0.4 MS
MDC_IDC_MSMT_LEADCHNL_RA_SENSING_INTR_AMPL: 3.38 MV
MDC_IDC_MSMT_LEADCHNL_RA_SENSING_INTR_AMPL: 3.38 MV
MDC_IDC_MSMT_LEADCHNL_RV_IMPEDANCE_VALUE: 323 OHM
MDC_IDC_MSMT_LEADCHNL_RV_IMPEDANCE_VALUE: 399 OHM
MDC_IDC_MSMT_LEADCHNL_RV_PACING_THRESHOLD_AMPLITUDE: 0.88 V
MDC_IDC_MSMT_LEADCHNL_RV_PACING_THRESHOLD_PULSEWIDTH: 0.4 MS
MDC_IDC_MSMT_LEADCHNL_RV_SENSING_INTR_AMPL: 2.75 MV
MDC_IDC_MSMT_LEADCHNL_RV_SENSING_INTR_AMPL: 2.75 MV
MDC_IDC_PG_IMPLANT_DTM: NORMAL
MDC_IDC_PG_MFG: NORMAL
MDC_IDC_PG_MODEL: NORMAL
MDC_IDC_PG_SERIAL: NORMAL
MDC_IDC_PG_TYPE: NORMAL
MDC_IDC_SESS_CLINIC_NAME: NORMAL
MDC_IDC_SESS_DTM: NORMAL
MDC_IDC_SESS_TYPE: NORMAL
MDC_IDC_SET_BRADY_AT_MODE_SWITCH_RATE: 171 {BEATS}/MIN
MDC_IDC_SET_BRADY_HYSTRATE: NORMAL
MDC_IDC_SET_BRADY_LOWRATE: 60 {BEATS}/MIN
MDC_IDC_SET_BRADY_MAX_SENSOR_RATE: 130 {BEATS}/MIN
MDC_IDC_SET_BRADY_MAX_TRACKING_RATE: 130 {BEATS}/MIN
MDC_IDC_SET_BRADY_MODE: NORMAL
MDC_IDC_SET_BRADY_PAV_DELAY_LOW: 180 MS
MDC_IDC_SET_BRADY_SAV_DELAY_LOW: 150 MS
MDC_IDC_SET_LEADCHNL_RA_PACING_AMPLITUDE: 1.5 V
MDC_IDC_SET_LEADCHNL_RA_PACING_ANODE_ELECTRODE_1: NORMAL
MDC_IDC_SET_LEADCHNL_RA_PACING_ANODE_LOCATION_1: NORMAL
MDC_IDC_SET_LEADCHNL_RA_PACING_CAPTURE_MODE: NORMAL
MDC_IDC_SET_LEADCHNL_RA_PACING_CATHODE_ELECTRODE_1: NORMAL
MDC_IDC_SET_LEADCHNL_RA_PACING_CATHODE_LOCATION_1: NORMAL
MDC_IDC_SET_LEADCHNL_RA_PACING_POLARITY: NORMAL
MDC_IDC_SET_LEADCHNL_RA_PACING_PULSEWIDTH: 0.4 MS
MDC_IDC_SET_LEADCHNL_RA_SENSING_ANODE_ELECTRODE_1: NORMAL
MDC_IDC_SET_LEADCHNL_RA_SENSING_ANODE_LOCATION_1: NORMAL
MDC_IDC_SET_LEADCHNL_RA_SENSING_CATHODE_ELECTRODE_1: NORMAL
MDC_IDC_SET_LEADCHNL_RA_SENSING_CATHODE_LOCATION_1: NORMAL
MDC_IDC_SET_LEADCHNL_RA_SENSING_POLARITY: NORMAL
MDC_IDC_SET_LEADCHNL_RA_SENSING_SENSITIVITY: 0.3 MV
MDC_IDC_SET_LEADCHNL_RV_PACING_AMPLITUDE: 1.5 V
MDC_IDC_SET_LEADCHNL_RV_PACING_ANODE_ELECTRODE_1: NORMAL
MDC_IDC_SET_LEADCHNL_RV_PACING_ANODE_LOCATION_1: NORMAL
MDC_IDC_SET_LEADCHNL_RV_PACING_CAPTURE_MODE: NORMAL
MDC_IDC_SET_LEADCHNL_RV_PACING_CATHODE_ELECTRODE_1: NORMAL
MDC_IDC_SET_LEADCHNL_RV_PACING_CATHODE_LOCATION_1: NORMAL
MDC_IDC_SET_LEADCHNL_RV_PACING_POLARITY: NORMAL
MDC_IDC_SET_LEADCHNL_RV_PACING_PULSEWIDTH: 0.4 MS
MDC_IDC_SET_LEADCHNL_RV_SENSING_ANODE_ELECTRODE_1: NORMAL
MDC_IDC_SET_LEADCHNL_RV_SENSING_ANODE_LOCATION_1: NORMAL
MDC_IDC_SET_LEADCHNL_RV_SENSING_CATHODE_ELECTRODE_1: NORMAL
MDC_IDC_SET_LEADCHNL_RV_SENSING_CATHODE_LOCATION_1: NORMAL
MDC_IDC_SET_LEADCHNL_RV_SENSING_POLARITY: NORMAL
MDC_IDC_SET_LEADCHNL_RV_SENSING_SENSITIVITY: 0.9 MV
MDC_IDC_SET_ZONE_DETECTION_INTERVAL: 200 MS
MDC_IDC_SET_ZONE_DETECTION_INTERVAL: 350 MS
MDC_IDC_SET_ZONE_DETECTION_INTERVAL: 400 MS
MDC_IDC_SET_ZONE_STATUS: NORMAL
MDC_IDC_SET_ZONE_TYPE: NORMAL
MDC_IDC_SET_ZONE_VENDOR_TYPE: NORMAL
MDC_IDC_STAT_AT_BURDEN_PERCENT: 0 %
MDC_IDC_STAT_AT_DTM_END: NORMAL
MDC_IDC_STAT_AT_DTM_START: NORMAL
MDC_IDC_STAT_BRADY_AP_VP_PERCENT: 0.03 %
MDC_IDC_STAT_BRADY_AP_VS_PERCENT: 20.74 %
MDC_IDC_STAT_BRADY_AS_VP_PERCENT: 0.06 %
MDC_IDC_STAT_BRADY_AS_VS_PERCENT: 79.17 %
MDC_IDC_STAT_BRADY_DTM_END: NORMAL
MDC_IDC_STAT_BRADY_DTM_START: NORMAL
MDC_IDC_STAT_BRADY_RA_PERCENT_PACED: 20.76 %
MDC_IDC_STAT_BRADY_RV_PERCENT_PACED: 0.09 %
MDC_IDC_STAT_EPISODE_RECENT_COUNT: 0
MDC_IDC_STAT_EPISODE_RECENT_COUNT_DTM_END: NORMAL
MDC_IDC_STAT_EPISODE_RECENT_COUNT_DTM_START: NORMAL
MDC_IDC_STAT_EPISODE_TOTAL_COUNT: 0
MDC_IDC_STAT_EPISODE_TOTAL_COUNT: 0
MDC_IDC_STAT_EPISODE_TOTAL_COUNT: 1
MDC_IDC_STAT_EPISODE_TOTAL_COUNT: 11
MDC_IDC_STAT_EPISODE_TOTAL_COUNT: 47
MDC_IDC_STAT_EPISODE_TOTAL_COUNT_DTM_END: NORMAL
MDC_IDC_STAT_EPISODE_TOTAL_COUNT_DTM_START: NORMAL
MDC_IDC_STAT_EPISODE_TYPE: NORMAL
MDC_IDC_STAT_TACHYTHERAPY_RECENT_DTM_END: NORMAL
MDC_IDC_STAT_TACHYTHERAPY_RECENT_DTM_START: NORMAL
MDC_IDC_STAT_TACHYTHERAPY_TOTAL_DTM_END: NORMAL
MDC_IDC_STAT_TACHYTHERAPY_TOTAL_DTM_START: NORMAL

## 2025-05-21 PROCEDURE — 93294 REM INTERROG EVL PM/LDLS PM: CPT | Performed by: INTERNAL MEDICINE

## 2025-05-21 PROCEDURE — 93296 REM INTERROG EVL PM/IDS: CPT | Performed by: INTERNAL MEDICINE
